# Patient Record
Sex: FEMALE | Race: WHITE | NOT HISPANIC OR LATINO | URBAN - METROPOLITAN AREA
[De-identification: names, ages, dates, MRNs, and addresses within clinical notes are randomized per-mention and may not be internally consistent; named-entity substitution may affect disease eponyms.]

---

## 2017-03-07 ENCOUNTER — INPATIENT (INPATIENT)
Facility: HOSPITAL | Age: 67
LOS: 3 days | Discharge: HOME | End: 2017-03-11
Attending: INTERNAL MEDICINE | Admitting: INTERNAL MEDICINE
Payer: MEDICARE

## 2017-03-07 VITALS
WEIGHT: 203.05 LBS | DIASTOLIC BLOOD PRESSURE: 87 MMHG | HEART RATE: 77 BPM | OXYGEN SATURATION: 97 % | RESPIRATION RATE: 19 BRPM | HEIGHT: 62 IN | SYSTOLIC BLOOD PRESSURE: 167 MMHG

## 2017-03-07 DIAGNOSIS — I25.10 ATHEROSCLEROTIC HEART DISEASE OF NATIVE CORONARY ARTERY WITHOUT ANGINA PECTORIS: ICD-10-CM

## 2017-03-07 DIAGNOSIS — E11.9 TYPE 2 DIABETES MELLITUS WITHOUT COMPLICATIONS: ICD-10-CM

## 2017-03-07 DIAGNOSIS — N23 UNSPECIFIED RENAL COLIC: ICD-10-CM

## 2017-03-07 DIAGNOSIS — N20.0 CALCULUS OF KIDNEY: ICD-10-CM

## 2017-03-07 DIAGNOSIS — I10 ESSENTIAL (PRIMARY) HYPERTENSION: ICD-10-CM

## 2017-03-07 LAB
ALBUMIN SERPL ELPH-MCNC: 3.8 G/DL — SIGNIFICANT CHANGE UP (ref 3.3–5)
ALP SERPL-CCNC: 113 U/L — SIGNIFICANT CHANGE UP (ref 40–120)
ALT FLD-CCNC: 20 U/L — SIGNIFICANT CHANGE UP (ref 12–78)
AMYLASE P1 CFR SERPL: 57 U/L — SIGNIFICANT CHANGE UP (ref 25–115)
ANION GAP SERPL CALC-SCNC: 12 MMOL/L — SIGNIFICANT CHANGE UP (ref 5–17)
APPEARANCE UR: ABNORMAL
AST SERPL-CCNC: 22 U/L — SIGNIFICANT CHANGE UP (ref 15–37)
BACTERIA # UR AUTO: ABNORMAL
BASOPHILS # BLD AUTO: 0.1 K/UL — SIGNIFICANT CHANGE UP (ref 0–0.2)
BASOPHILS NFR BLD AUTO: 0.6 % — SIGNIFICANT CHANGE UP (ref 0–2)
BILIRUB SERPL-MCNC: 0.5 MG/DL — SIGNIFICANT CHANGE UP (ref 0.2–1.2)
BILIRUB UR-MCNC: NEGATIVE — SIGNIFICANT CHANGE UP
BUN SERPL-MCNC: 27 MG/DL — HIGH (ref 7–23)
CALCIUM SERPL-MCNC: 9.3 MG/DL — SIGNIFICANT CHANGE UP (ref 8.5–10.1)
CHLORIDE SERPL-SCNC: 105 MMOL/L — SIGNIFICANT CHANGE UP (ref 96–108)
CO2 SERPL-SCNC: 23 MMOL/L — SIGNIFICANT CHANGE UP (ref 22–31)
COLOR SPEC: YELLOW — SIGNIFICANT CHANGE UP
CREAT SERPL-MCNC: 1.04 MG/DL — SIGNIFICANT CHANGE UP (ref 0.5–1.3)
DIFF PNL FLD: ABNORMAL
EOSINOPHIL # BLD AUTO: 0.1 K/UL — SIGNIFICANT CHANGE UP (ref 0–0.5)
EOSINOPHIL NFR BLD AUTO: 0.5 % — SIGNIFICANT CHANGE UP (ref 0–6)
EPI CELLS # UR: ABNORMAL
GLUCOSE SERPL-MCNC: 219 MG/DL — HIGH (ref 70–99)
GLUCOSE UR QL: 250 MG/DL
HCG SERPL-ACNC: 2 MIU/ML — SIGNIFICANT CHANGE UP
HCT VFR BLD CALC: 38.2 % — SIGNIFICANT CHANGE UP (ref 34.5–45)
HGB BLD-MCNC: 12.9 G/DL — SIGNIFICANT CHANGE UP (ref 11.5–15.5)
KETONES UR-MCNC: NEGATIVE — SIGNIFICANT CHANGE UP
LEUKOCYTE ESTERASE UR-ACNC: ABNORMAL
LIDOCAIN IGE QN: 130 U/L — SIGNIFICANT CHANGE UP (ref 73–393)
LYMPHOCYTES # BLD AUTO: 15.7 % — SIGNIFICANT CHANGE UP (ref 13–44)
LYMPHOCYTES # BLD AUTO: 2 K/UL — SIGNIFICANT CHANGE UP (ref 1–3.3)
MCHC RBC-ENTMCNC: 26.8 PG — LOW (ref 27–34)
MCHC RBC-ENTMCNC: 33.9 GM/DL — SIGNIFICANT CHANGE UP (ref 32–36)
MCV RBC AUTO: 79.1 FL — LOW (ref 80–100)
MONOCYTES # BLD AUTO: 0.8 K/UL — SIGNIFICANT CHANGE UP (ref 0–0.9)
MONOCYTES NFR BLD AUTO: 6.1 % — SIGNIFICANT CHANGE UP (ref 2–14)
NEUTROPHILS # BLD AUTO: 9.7 K/UL — HIGH (ref 1.8–7.4)
NEUTROPHILS NFR BLD AUTO: 77.1 % — HIGH (ref 43–77)
NITRITE UR-MCNC: NEGATIVE — SIGNIFICANT CHANGE UP
PH UR: 5 — SIGNIFICANT CHANGE UP (ref 4.8–8)
PLATELET # BLD AUTO: 276 K/UL — SIGNIFICANT CHANGE UP (ref 150–400)
POTASSIUM SERPL-MCNC: 4.5 MMOL/L — SIGNIFICANT CHANGE UP (ref 3.5–5.3)
POTASSIUM SERPL-SCNC: 4.5 MMOL/L — SIGNIFICANT CHANGE UP (ref 3.5–5.3)
PROT SERPL-MCNC: 8 GM/DL — SIGNIFICANT CHANGE UP (ref 6–8.3)
PROT UR-MCNC: 15 MG/DL
RBC # BLD: 4.83 M/UL — SIGNIFICANT CHANGE UP (ref 3.8–5.2)
RBC # FLD: 12.4 % — SIGNIFICANT CHANGE UP (ref 11–15)
RBC CASTS # UR COMP ASSIST: SIGNIFICANT CHANGE UP /HPF (ref 0–4)
SODIUM SERPL-SCNC: 140 MMOL/L — SIGNIFICANT CHANGE UP (ref 135–145)
SP GR SPEC: 1.02 — SIGNIFICANT CHANGE UP (ref 1.01–1.02)
UROBILINOGEN FLD QL: NEGATIVE MG/DL — SIGNIFICANT CHANGE UP
WBC # BLD: 12.6 K/UL — HIGH (ref 3.8–10.5)
WBC # FLD AUTO: 12.6 K/UL — HIGH (ref 3.8–10.5)
WBC UR QL: ABNORMAL

## 2017-03-07 PROCEDURE — 99285 EMERGENCY DEPT VISIT HI MDM: CPT

## 2017-03-07 PROCEDURE — 74176 CT ABD & PELVIS W/O CONTRAST: CPT | Mod: 26

## 2017-03-07 RX ORDER — SODIUM CHLORIDE 9 MG/ML
1000 INJECTION, SOLUTION INTRAVENOUS
Qty: 0 | Refills: 0 | Status: DISCONTINUED | OUTPATIENT
Start: 2017-03-07 | End: 2017-03-09

## 2017-03-07 RX ORDER — ACETAMINOPHEN 500 MG
650 TABLET ORAL EVERY 6 HOURS
Qty: 0 | Refills: 0 | Status: DISCONTINUED | OUTPATIENT
Start: 2017-03-07 | End: 2017-03-09

## 2017-03-07 RX ORDER — FAMOTIDINE 10 MG/ML
20 INJECTION INTRAVENOUS ONCE
Qty: 0 | Refills: 0 | Status: COMPLETED | OUTPATIENT
Start: 2017-03-07 | End: 2017-03-07

## 2017-03-07 RX ORDER — METOPROLOL TARTRATE 50 MG
50 TABLET ORAL DAILY
Qty: 0 | Refills: 0 | Status: DISCONTINUED | OUTPATIENT
Start: 2017-03-07 | End: 2017-03-09

## 2017-03-07 RX ORDER — DEXTROSE 50 % IN WATER 50 %
1 SYRINGE (ML) INTRAVENOUS ONCE
Qty: 0 | Refills: 0 | Status: DISCONTINUED | OUTPATIENT
Start: 2017-03-07 | End: 2017-03-09

## 2017-03-07 RX ORDER — DEXTROSE 50 % IN WATER 50 %
25 SYRINGE (ML) INTRAVENOUS ONCE
Qty: 0 | Refills: 0 | Status: DISCONTINUED | OUTPATIENT
Start: 2017-03-07 | End: 2017-03-09

## 2017-03-07 RX ORDER — CEFTRIAXONE 500 MG/1
1 INJECTION, POWDER, FOR SOLUTION INTRAMUSCULAR; INTRAVENOUS EVERY 24 HOURS
Qty: 0 | Refills: 0 | Status: DISCONTINUED | OUTPATIENT
Start: 2017-03-07 | End: 2017-03-09

## 2017-03-07 RX ORDER — CLOPIDOGREL BISULFATE 75 MG/1
75 TABLET, FILM COATED ORAL DAILY
Qty: 0 | Refills: 0 | Status: DISCONTINUED | OUTPATIENT
Start: 2017-03-07 | End: 2017-03-08

## 2017-03-07 RX ORDER — DEXTROSE 50 % IN WATER 50 %
12.5 SYRINGE (ML) INTRAVENOUS ONCE
Qty: 0 | Refills: 0 | Status: DISCONTINUED | OUTPATIENT
Start: 2017-03-07 | End: 2017-03-09

## 2017-03-07 RX ORDER — GLUCAGON INJECTION, SOLUTION 0.5 MG/.1ML
1 INJECTION, SOLUTION SUBCUTANEOUS ONCE
Qty: 0 | Refills: 0 | Status: DISCONTINUED | OUTPATIENT
Start: 2017-03-07 | End: 2017-03-09

## 2017-03-07 RX ORDER — SODIUM CHLORIDE 9 MG/ML
1000 INJECTION INTRAMUSCULAR; INTRAVENOUS; SUBCUTANEOUS ONCE
Qty: 0 | Refills: 0 | Status: COMPLETED | OUTPATIENT
Start: 2017-03-07 | End: 2017-03-07

## 2017-03-07 RX ORDER — MORPHINE SULFATE 50 MG/1
2 CAPSULE, EXTENDED RELEASE ORAL ONCE
Qty: 0 | Refills: 0 | Status: DISCONTINUED | OUTPATIENT
Start: 2017-03-07 | End: 2017-03-07

## 2017-03-07 RX ORDER — ONDANSETRON 8 MG/1
8 TABLET, FILM COATED ORAL ONCE
Qty: 0 | Refills: 0 | Status: COMPLETED | OUTPATIENT
Start: 2017-03-07 | End: 2017-03-07

## 2017-03-07 RX ORDER — MORPHINE SULFATE 50 MG/1
2 CAPSULE, EXTENDED RELEASE ORAL EVERY 4 HOURS
Qty: 0 | Refills: 0 | Status: DISCONTINUED | OUTPATIENT
Start: 2017-03-07 | End: 2017-03-09

## 2017-03-07 RX ORDER — CEFTRIAXONE 500 MG/1
1 INJECTION, POWDER, FOR SOLUTION INTRAMUSCULAR; INTRAVENOUS ONCE
Qty: 0 | Refills: 0 | Status: COMPLETED | OUTPATIENT
Start: 2017-03-07 | End: 2017-03-07

## 2017-03-07 RX ORDER — INSULIN LISPRO 100/ML
VIAL (ML) SUBCUTANEOUS
Qty: 0 | Refills: 0 | Status: DISCONTINUED | OUTPATIENT
Start: 2017-03-07 | End: 2017-03-09

## 2017-03-07 RX ORDER — KETOROLAC TROMETHAMINE 30 MG/ML
30 SYRINGE (ML) INJECTION ONCE
Qty: 0 | Refills: 0 | Status: DISCONTINUED | OUTPATIENT
Start: 2017-03-07 | End: 2017-03-07

## 2017-03-07 RX ORDER — METOPROLOL TARTRATE 50 MG
1 TABLET ORAL
Qty: 0 | Refills: 0 | COMMUNITY

## 2017-03-07 RX ORDER — DIPHENHYDRAMINE HCL 50 MG
25 CAPSULE ORAL ONCE
Qty: 0 | Refills: 0 | Status: DISCONTINUED | OUTPATIENT
Start: 2017-03-07 | End: 2017-03-09

## 2017-03-07 RX ORDER — ATORVASTATIN CALCIUM 80 MG/1
80 TABLET, FILM COATED ORAL AT BEDTIME
Qty: 0 | Refills: 0 | Status: DISCONTINUED | OUTPATIENT
Start: 2017-03-07 | End: 2017-03-09

## 2017-03-07 RX ADMIN — SODIUM CHLORIDE 75 MILLILITER(S): 9 INJECTION, SOLUTION INTRAVENOUS at 16:53

## 2017-03-07 RX ADMIN — FAMOTIDINE 20 MILLIGRAM(S): 10 INJECTION INTRAVENOUS at 09:45

## 2017-03-07 RX ADMIN — SODIUM CHLORIDE 2000 MILLILITER(S): 9 INJECTION INTRAMUSCULAR; INTRAVENOUS; SUBCUTANEOUS at 09:35

## 2017-03-07 RX ADMIN — MORPHINE SULFATE 2 MILLIGRAM(S): 50 CAPSULE, EXTENDED RELEASE ORAL at 13:16

## 2017-03-07 RX ADMIN — MORPHINE SULFATE 2 MILLIGRAM(S): 50 CAPSULE, EXTENDED RELEASE ORAL at 10:02

## 2017-03-07 RX ADMIN — Medication 30 MILLIGRAM(S): at 10:02

## 2017-03-07 RX ADMIN — CEFTRIAXONE 100 GRAM(S): 500 INJECTION, POWDER, FOR SOLUTION INTRAMUSCULAR; INTRAVENOUS at 14:09

## 2017-03-07 RX ADMIN — SODIUM CHLORIDE 75 MILLILITER(S): 9 INJECTION, SOLUTION INTRAVENOUS at 19:30

## 2017-03-07 RX ADMIN — ATORVASTATIN CALCIUM 80 MILLIGRAM(S): 80 TABLET, FILM COATED ORAL at 21:43

## 2017-03-07 RX ADMIN — Medication 2: at 16:52

## 2017-03-07 RX ADMIN — CLOPIDOGREL BISULFATE 75 MILLIGRAM(S): 75 TABLET, FILM COATED ORAL at 16:56

## 2017-03-07 RX ADMIN — MORPHINE SULFATE 2 MILLIGRAM(S): 50 CAPSULE, EXTENDED RELEASE ORAL at 10:03

## 2017-03-07 RX ADMIN — ONDANSETRON 8 MILLIGRAM(S): 8 TABLET, FILM COATED ORAL at 09:44

## 2017-03-07 RX ADMIN — Medication 125 MILLIGRAM(S): at 14:09

## 2017-03-07 NOTE — ED PROVIDER NOTE - MEDICAL DECISION MAKING DETAILS
pt presents today with right flank pain since 3am, has h/o kidney stones, labs, ivf, pain control and ct

## 2017-03-07 NOTE — ED PROVIDER NOTE - CARE PLAN
Principal Discharge DX:	Right flank pain Principal Discharge DX:	Kidney stone on right side  Secondary Diagnosis:	Intractable pain

## 2017-03-07 NOTE — ED PROVIDER NOTE - OBJECTIVE STATEMENT
67 year o 67 year old female with past medical history of HTN, CAD s/p coronary artery stent and kidney stones presents today c/o right flank pain sine 3am, she describes a pain that was initially tolerable, but now constant dull ache that is nonradiating rated 6-7/10 +nausea/vomiting once prior to coming in, still nauseous in the ER (-)hematuria (-) fevers (-) chills

## 2017-03-07 NOTE — H&P ADULT. - ASSESSMENT
67 years old female is admitted for right renal colic, UTI, dehydration and hyperglycemia from DM II.  Plan: IV Abx, IV hydration. Pain management

## 2017-03-07 NOTE — ED ADULT NURSE NOTE - OBJECTIVE STATEMENT
States she has had back pain since last night and it has gotten progressively worse. Denies taking anything to alleviate the pain.

## 2017-03-07 NOTE — ED PROVIDER NOTE - CONSTITUTIONAL, MLM
normal... Well appearing, well nourished, awake, alert, oriented to person, place, time/situation, sitting up in the bed appears uncomfortable

## 2017-03-07 NOTE — CONSULT NOTE ADULT - ASSESSMENT
67 yrs old female severe pain right flank with 7 mm stone at right UPJ with hydronephrosis admitted for pain management and treatment of the stone.

## 2017-03-07 NOTE — H&P ADULT. - ATTENDING COMMENTS
right flank pain , right hydronephrosis, right UPJ stone. Persistent pain, scheduled for right uretero lithotripsy and insertion of stent

## 2017-03-07 NOTE — ED ADULT TRIAGE NOTE - CHIEF COMPLAINT QUOTE
Pt with right flank pain since last night and she has a history of kidney stones. With nausea and vomiting

## 2017-03-08 LAB
ANION GAP SERPL CALC-SCNC: 11 MMOL/L — SIGNIFICANT CHANGE UP (ref 5–17)
BUN SERPL-MCNC: 28 MG/DL — HIGH (ref 7–23)
CALCIUM SERPL-MCNC: 8.3 MG/DL — LOW (ref 8.5–10.1)
CHLORIDE SERPL-SCNC: 106 MMOL/L — SIGNIFICANT CHANGE UP (ref 96–108)
CO2 SERPL-SCNC: 24 MMOL/L — SIGNIFICANT CHANGE UP (ref 22–31)
CREAT SERPL-MCNC: 1.45 MG/DL — HIGH (ref 0.5–1.3)
GLUCOSE SERPL-MCNC: 219 MG/DL — HIGH (ref 70–99)
HBA1C BLD-MCNC: 8.5 % — HIGH (ref 4–5.6)
HCT VFR BLD CALC: 34.4 % — LOW (ref 34.5–45)
HGB BLD-MCNC: 12 G/DL — SIGNIFICANT CHANGE UP (ref 11.5–15.5)
MCHC RBC-ENTMCNC: 27.6 PG — SIGNIFICANT CHANGE UP (ref 27–34)
MCHC RBC-ENTMCNC: 35 GM/DL — SIGNIFICANT CHANGE UP (ref 32–36)
MCV RBC AUTO: 78.8 FL — LOW (ref 80–100)
PLATELET # BLD AUTO: 207 K/UL — SIGNIFICANT CHANGE UP (ref 150–400)
POTASSIUM SERPL-MCNC: 4.4 MMOL/L — SIGNIFICANT CHANGE UP (ref 3.5–5.3)
POTASSIUM SERPL-SCNC: 4.4 MMOL/L — SIGNIFICANT CHANGE UP (ref 3.5–5.3)
RBC # BLD: 4.37 M/UL — SIGNIFICANT CHANGE UP (ref 3.8–5.2)
RBC # FLD: 12.4 % — SIGNIFICANT CHANGE UP (ref 11–15)
SODIUM SERPL-SCNC: 141 MMOL/L — SIGNIFICANT CHANGE UP (ref 135–145)
WBC # BLD: 13.2 K/UL — HIGH (ref 3.8–10.5)
WBC # FLD AUTO: 13.2 K/UL — HIGH (ref 3.8–10.5)

## 2017-03-08 PROCEDURE — 74000: CPT | Mod: 26

## 2017-03-08 RX ADMIN — SODIUM CHLORIDE 75 MILLILITER(S): 9 INJECTION, SOLUTION INTRAVENOUS at 18:27

## 2017-03-08 RX ADMIN — Medication 4: at 19:26

## 2017-03-08 RX ADMIN — MORPHINE SULFATE 2 MILLIGRAM(S): 50 CAPSULE, EXTENDED RELEASE ORAL at 16:40

## 2017-03-08 RX ADMIN — MORPHINE SULFATE 2 MILLIGRAM(S): 50 CAPSULE, EXTENDED RELEASE ORAL at 20:41

## 2017-03-08 RX ADMIN — MORPHINE SULFATE 2 MILLIGRAM(S): 50 CAPSULE, EXTENDED RELEASE ORAL at 16:21

## 2017-03-08 RX ADMIN — Medication 50 MILLIGRAM(S): at 05:06

## 2017-03-08 RX ADMIN — Medication 4: at 11:49

## 2017-03-08 RX ADMIN — Medication 4: at 07:57

## 2017-03-08 RX ADMIN — ATORVASTATIN CALCIUM 80 MILLIGRAM(S): 80 TABLET, FILM COATED ORAL at 22:13

## 2017-03-08 RX ADMIN — CEFTRIAXONE 100 GRAM(S): 500 INJECTION, POWDER, FOR SOLUTION INTRAMUSCULAR; INTRAVENOUS at 15:00

## 2017-03-08 RX ADMIN — MORPHINE SULFATE 2 MILLIGRAM(S): 50 CAPSULE, EXTENDED RELEASE ORAL at 20:56

## 2017-03-08 RX ADMIN — CLOPIDOGREL BISULFATE 75 MILLIGRAM(S): 75 TABLET, FILM COATED ORAL at 12:45

## 2017-03-08 NOTE — PROGRESS NOTE ADULT - SUBJECTIVE AND OBJECTIVE BOX
Patient is a 67y old  Female who presents with a chief complaint of Renal colic (07 Mar 2017 15:25)    MEDICAL PROBLEMS:  KIDNEY STONE ON RIGHT SIDE  KIDNEY STONE  Kidney stones  HTN (hypertension)  CAD (coronary artery disease)  Kidney stone on right side  Right flank pain  Diabetes  CAD (coronary artery disease)  HTN (hypertension)  Kidney stone on right side  Renal colic on right side  Intractable pain      INTERVAL HPI/OVERNIGHT EVENTS: Right renal colic    MEDICATIONS  (STANDING):  atorvastatin 80milliGRAM(s) Oral at bedtime  clopidogrel Tablet 75milliGRAM(s) Oral daily  metoprolol succinate ER 50milliGRAM(s) Oral daily  sodium chloride 0.45%. 1000milliLiter(s) IV Continuous <Continuous>  cefTRIAXone   IVPB 1Gram(s) IV Intermittent every 24 hours  insulin lispro (HumaLOG) corrective regimen sliding scale  SubCutaneous three times a day before meals  dextrose 5%. 1000milliLiter(s) IV Continuous <Continuous>  dextrose 50% Injectable 12.5Gram(s) IV Push once  dextrose 50% Injectable 25Gram(s) IV Push once  dextrose 50% Injectable 25Gram(s) IV Push once    MEDICATIONS  (PRN):  diphenhydrAMINE   Capsule 25milliGRAM(s) Oral once PRN Rash and/or Itching  dextrose Gel 1Dose(s) Oral once PRN Blood Glucose LESS THAN 70 milliGRAM(s)/deciliter  glucagon  Injectable 1milliGRAM(s) IntraMuscular once PRN Glucose LESS THAN 70 milligrams/deciliter  acetaminophen   Tablet 650milliGRAM(s) Oral every 6 hours PRN For Temp greater than 38 C (100.4 F)  acetaminophen   Tablet. 650milliGRAM(s) Oral every 6 hours PRN Mild Pain (1 - 3)  morphine  - Injectable 2milliGRAM(s) IV Push every 4 hours PRN Moderate Pain (4 - 6)    Allergies    No Known Allergies    Intolerances      Vital Signs Last 24 Hrs  T(C): 36.7, Max: 37.1 (03-07 @ 23:58)  T(F): 98, Max: 98.7 (03-07 @ 23:58)  HR: 79 (77 - 89)  BP: 146/61 (138/84 - 179/56)  BP(mean): --  RR: 17 (16 - 18)  SpO2: 97% (95% - 97%)    PHYSICAL EXAM:  GENERAL: NAD, well-groomed, well-developed  HEAD:  Atraumatic, Normocephalic  EYES: EOMI, PERRLA, conjunctiva and sclera clear  ENMT: No tonsillar erythema, exudates, or enlargement; Moist mucous membranes, Good dentition, No lesions  NECK: Supple, No JVD, Normal thyroid  NERVOUS SYSTEM:  Alert & Oriented X3, Good concentration; Motor Strength 5/5 B/L upper and lower extremities; DTRs 2+ intact and symmetric  CHEST/LUNG: Clear to percussion bilaterally; No rales, rhonchi, wheezing, or rubs  HEART: Regular rate and rhythm; No murmurs, rubs, or gallops  ABDOMEN: Soft, Nontender, Nondistended; Bowel sounds present  EXTREMITIES:  2+ Peripheral Pulses, No clubbing, cyanosis, or edema  LYMPH: No lymphadenopathy noted  SKIN: No rashes or lesions    I & Os for current day (as of  @ 09:09)  =============================================  IN: 975 ml / OUT: 0 ml / NET: 975 ml      LABS:                        12.0   13.2  )-----------( 207      ( 08 Mar 2017 06:36 )             34.4     08 Mar 2017 06:36    141    |  106    |  28     ----------------------------<  219    4.4     |  24     |  1.45     Ca    8.3        08 Mar 2017 06:36    TPro  8.0    /  Alb  3.8    /  TBili  0.5    /  DBili  x      /  AST  22     /  ALT  20     /  AlkPhos  113    07 Mar 2017 10:18      Urinalysis Basic - ( 07 Mar 2017 20:36 )    Color: Yellow / Appearance: Slightly Turbid / S.020 / pH: x  Gluc: x / Ketone: Negative  / Bili: Negative / Urobili: Negative mg/dL   Blood: x / Protein: 15 mg/dL / Nitrite: Negative   Leuk Esterase: Moderate / RBC: 0-2 /HPF / WBC 11-25   Sq Epi: x / Non Sq Epi: Moderate / Bacteria: Moderate      CAPILLARY BLOOD GLUCOSE  204 (08 Mar 2017 07:49)  229 (07 Mar 2017 21:41)  177 (07 Mar 2017 16:43)    RADIOLOGY & ADDITIONAL TESTS:    Imaging Personally Reviewed:  [X] YES  [ ] NO    Consultant(s) Notes Reviewed:  [X] YES  [ ] NO    Care Discussed with Consultants/Other Providers [X] YES  [ ] NO

## 2017-03-08 NOTE — PROGRESS NOTE ADULT - SUBJECTIVE AND OBJECTIVE BOX
Patient seen and examined bedside resting comfortably.  No complaints offered. No pain. No difficulty urinating  Denies nausea and vomiting. Tolerating diet.  Flatus/BM. +  Denies chest pain, dyspnea, cough.    T(F): 98, Max: 98.7 (03-07 @ 23:58)  HR: 79 (77 - 89)  BP: 146/61 (138/84 - 179/56)  RR: 17 (16 - 18)  SpO2: 97% (95% - 97%)    CAPILLARY BLOOD GLUCOSE  204 (08 Mar 2017 07:49)  229 (07 Mar 2017 21:41)  177 (07 Mar 2017 16:43)      PHYSICAL EXAM:  General: NAD, WDWN  Neuro:  Alert & oriented x 3  Abdomen: soft, NTND. No right flank tenderness.      LABS:                        12.0   13.2  )-----------( 207      ( 08 Mar 2017 06:36 )             34.4     08 Mar 2017 06:36    141    |  106    |  28     ----------------------------<  219    4.4     |  24     |  1.45     Ca    8.3        08 Mar 2017 06:36    TPro  8.0    /  Alb  3.8    /  TBili  0.5    /  DBili  x      /  AST  22     /  ALT  20     /  AlkPhos  113    07 Mar 2017 10:18    Urinalysis (03.07.17 @ 20:36)    Blood, Urine: Small    Glucose Qualitative, Urine: 250 mg/dL    pH Urine: 5.0    Color: Yellow    Urine Appearance: Slightly Turbid    Bilirubin: Negative    Ketone - Urine: Negative    Epithelial Cells: Moderate    Bacteria: Moderate    White Blood Cell - Urine: 11-25    Red Blood Cell - Urine: 0-2 /HPF   Specific Gravity: 1.020    Protein, Urine: 15 mg/dL    Urobilinogen: Negative mg/dL    Nitrite: Negative    Leukocyte Esterase Concentration: Moderate    [place]:  6-7 mm RIGHT ureteropelvic junction (UPJ) calculus.  Mild RIGHT hydronephrosis        I&O's Detail    I & Os for current day (as of 08 Mar 2017 08:43)  =============================================  IN:    sodium chloride 0.45%.: 975 ml    Total IN: 975 ml  ---------------------------------------------  OUT:    Total OUT: 0 ml  ---------------------------------------------  Total NET: 975 ml      Impression: 67y Female admitted with KIDNEY STONE ON RIGHT SIDE  KIDNEY STONE        Plan:  -continue VTE prophylaxis   - continue IVAB per ID  - continue medical f/u  -Increase activity with PT, OOB, Ambulate  -Patient instructed on and encouraged incentive spirometry use  -continue local wound care  -f/u AM labs  -Await reevaluation by Dr. Gallardo. Lithotripsy?

## 2017-03-09 DIAGNOSIS — E11.9 TYPE 2 DIABETES MELLITUS WITHOUT COMPLICATIONS: ICD-10-CM

## 2017-03-09 LAB
ANION GAP SERPL CALC-SCNC: 10 MMOL/L — SIGNIFICANT CHANGE UP (ref 5–17)
APTT BLD: 27 SEC — LOW (ref 27.5–37.4)
BASOPHILS # BLD AUTO: 0.1 K/UL — SIGNIFICANT CHANGE UP (ref 0–0.2)
BASOPHILS NFR BLD AUTO: 0.6 % — SIGNIFICANT CHANGE UP (ref 0–2)
BUN SERPL-MCNC: 28 MG/DL — HIGH (ref 7–23)
CALCIUM SERPL-MCNC: 8.2 MG/DL — LOW (ref 8.5–10.1)
CHLORIDE SERPL-SCNC: 105 MMOL/L — SIGNIFICANT CHANGE UP (ref 96–108)
CO2 SERPL-SCNC: 24 MMOL/L — SIGNIFICANT CHANGE UP (ref 22–31)
CREAT SERPL-MCNC: 1.32 MG/DL — HIGH (ref 0.5–1.3)
EOSINOPHIL # BLD AUTO: 0.1 K/UL — SIGNIFICANT CHANGE UP (ref 0–0.5)
EOSINOPHIL NFR BLD AUTO: 0.9 % — SIGNIFICANT CHANGE UP (ref 0–6)
GLUCOSE SERPL-MCNC: 151 MG/DL — HIGH (ref 70–99)
HCT VFR BLD CALC: 36 % — SIGNIFICANT CHANGE UP (ref 34.5–45)
HGB BLD-MCNC: 12.2 G/DL — SIGNIFICANT CHANGE UP (ref 11.5–15.5)
INR BLD: 1.01 RATIO — SIGNIFICANT CHANGE UP (ref 0.88–1.16)
LYMPHOCYTES # BLD AUTO: 2.6 K/UL — SIGNIFICANT CHANGE UP (ref 1–3.3)
LYMPHOCYTES # BLD AUTO: 20 % — SIGNIFICANT CHANGE UP (ref 13–44)
MCHC RBC-ENTMCNC: 26.9 PG — LOW (ref 27–34)
MCHC RBC-ENTMCNC: 33.9 GM/DL — SIGNIFICANT CHANGE UP (ref 32–36)
MCV RBC AUTO: 79.3 FL — LOW (ref 80–100)
MONOCYTES # BLD AUTO: 1.5 K/UL — HIGH (ref 0–0.9)
MONOCYTES NFR BLD AUTO: 11.8 % — SIGNIFICANT CHANGE UP (ref 2–14)
NEUTROPHILS # BLD AUTO: 8.7 K/UL — HIGH (ref 1.8–7.4)
NEUTROPHILS NFR BLD AUTO: 66.6 % — SIGNIFICANT CHANGE UP (ref 43–77)
PLATELET # BLD AUTO: 238 K/UL — SIGNIFICANT CHANGE UP (ref 150–400)
POTASSIUM SERPL-MCNC: 4.3 MMOL/L — SIGNIFICANT CHANGE UP (ref 3.5–5.3)
POTASSIUM SERPL-SCNC: 4.3 MMOL/L — SIGNIFICANT CHANGE UP (ref 3.5–5.3)
PROTHROM AB SERPL-ACNC: 11.3 SEC — SIGNIFICANT CHANGE UP (ref 10–13.1)
RBC # BLD: 4.54 M/UL — SIGNIFICANT CHANGE UP (ref 3.8–5.2)
RBC # FLD: 12.6 % — SIGNIFICANT CHANGE UP (ref 11–15)
SODIUM SERPL-SCNC: 139 MMOL/L — SIGNIFICANT CHANGE UP (ref 135–145)
WBC # BLD: 13 K/UL — HIGH (ref 3.8–10.5)
WBC # FLD AUTO: 13 K/UL — HIGH (ref 3.8–10.5)

## 2017-03-09 RX ORDER — DEXTROSE 50 % IN WATER 50 %
1 SYRINGE (ML) INTRAVENOUS ONCE
Qty: 0 | Refills: 0 | Status: DISCONTINUED | OUTPATIENT
Start: 2017-03-09 | End: 2017-03-11

## 2017-03-09 RX ORDER — DEXTROSE 50 % IN WATER 50 %
25 SYRINGE (ML) INTRAVENOUS ONCE
Qty: 0 | Refills: 0 | Status: DISCONTINUED | OUTPATIENT
Start: 2017-03-09 | End: 2017-03-11

## 2017-03-09 RX ORDER — SODIUM CHLORIDE 9 MG/ML
1000 INJECTION, SOLUTION INTRAVENOUS
Qty: 0 | Refills: 0 | Status: DISCONTINUED | OUTPATIENT
Start: 2017-03-09 | End: 2017-03-09

## 2017-03-09 RX ORDER — GLUCAGON INJECTION, SOLUTION 0.5 MG/.1ML
1 INJECTION, SOLUTION SUBCUTANEOUS ONCE
Qty: 0 | Refills: 0 | Status: DISCONTINUED | OUTPATIENT
Start: 2017-03-09 | End: 2017-03-11

## 2017-03-09 RX ORDER — ATORVASTATIN CALCIUM 80 MG/1
80 TABLET, FILM COATED ORAL AT BEDTIME
Qty: 0 | Refills: 0 | Status: DISCONTINUED | OUTPATIENT
Start: 2017-03-09 | End: 2017-03-11

## 2017-03-09 RX ORDER — MORPHINE SULFATE 50 MG/1
2 CAPSULE, EXTENDED RELEASE ORAL ONCE
Qty: 0 | Refills: 0 | Status: DISCONTINUED | OUTPATIENT
Start: 2017-03-09 | End: 2017-03-11

## 2017-03-09 RX ORDER — CEFTRIAXONE 500 MG/1
1 INJECTION, POWDER, FOR SOLUTION INTRAMUSCULAR; INTRAVENOUS EVERY 24 HOURS
Qty: 0 | Refills: 0 | Status: DISCONTINUED | OUTPATIENT
Start: 2017-03-09 | End: 2017-03-11

## 2017-03-09 RX ORDER — ACETAMINOPHEN 500 MG
650 TABLET ORAL EVERY 6 HOURS
Qty: 0 | Refills: 0 | Status: DISCONTINUED | OUTPATIENT
Start: 2017-03-09 | End: 2017-03-11

## 2017-03-09 RX ORDER — MORPHINE SULFATE 50 MG/1
2 CAPSULE, EXTENDED RELEASE ORAL EVERY 4 HOURS
Qty: 0 | Refills: 0 | Status: DISCONTINUED | OUTPATIENT
Start: 2017-03-09 | End: 2017-03-11

## 2017-03-09 RX ORDER — METOPROLOL TARTRATE 50 MG
50 TABLET ORAL DAILY
Qty: 0 | Refills: 0 | Status: DISCONTINUED | OUTPATIENT
Start: 2017-03-09 | End: 2017-03-11

## 2017-03-09 RX ORDER — DIPHENHYDRAMINE HCL 50 MG
25 CAPSULE ORAL ONCE
Qty: 0 | Refills: 0 | Status: DISCONTINUED | OUTPATIENT
Start: 2017-03-09 | End: 2017-03-11

## 2017-03-09 RX ORDER — FUROSEMIDE 40 MG
10 TABLET ORAL ONCE
Qty: 0 | Refills: 0 | Status: COMPLETED | OUTPATIENT
Start: 2017-03-09 | End: 2017-03-09

## 2017-03-09 RX ORDER — SODIUM CHLORIDE 9 MG/ML
1000 INJECTION, SOLUTION INTRAVENOUS
Qty: 0 | Refills: 0 | Status: DISCONTINUED | OUTPATIENT
Start: 2017-03-09 | End: 2017-03-11

## 2017-03-09 RX ORDER — DEXTROSE 50 % IN WATER 50 %
12.5 SYRINGE (ML) INTRAVENOUS ONCE
Qty: 0 | Refills: 0 | Status: DISCONTINUED | OUTPATIENT
Start: 2017-03-09 | End: 2017-03-11

## 2017-03-09 RX ORDER — INSULIN LISPRO 100/ML
VIAL (ML) SUBCUTANEOUS
Qty: 0 | Refills: 0 | Status: DISCONTINUED | OUTPATIENT
Start: 2017-03-09 | End: 2017-03-11

## 2017-03-09 RX ADMIN — SODIUM CHLORIDE 75 MILLILITER(S): 9 INJECTION, SOLUTION INTRAVENOUS at 05:21

## 2017-03-09 RX ADMIN — SODIUM CHLORIDE 75 MILLILITER(S): 9 INJECTION, SOLUTION INTRAVENOUS at 19:00

## 2017-03-09 RX ADMIN — MORPHINE SULFATE 2 MILLIGRAM(S): 50 CAPSULE, EXTENDED RELEASE ORAL at 10:54

## 2017-03-09 RX ADMIN — ATORVASTATIN CALCIUM 80 MILLIGRAM(S): 80 TABLET, FILM COATED ORAL at 22:37

## 2017-03-09 RX ADMIN — MORPHINE SULFATE 2 MILLIGRAM(S): 50 CAPSULE, EXTENDED RELEASE ORAL at 01:56

## 2017-03-09 RX ADMIN — MORPHINE SULFATE 2 MILLIGRAM(S): 50 CAPSULE, EXTENDED RELEASE ORAL at 02:11

## 2017-03-09 RX ADMIN — MORPHINE SULFATE 2 MILLIGRAM(S): 50 CAPSULE, EXTENDED RELEASE ORAL at 06:55

## 2017-03-09 RX ADMIN — Medication 10 MILLIGRAM(S): at 19:22

## 2017-03-09 RX ADMIN — MORPHINE SULFATE 2 MILLIGRAM(S): 50 CAPSULE, EXTENDED RELEASE ORAL at 11:10

## 2017-03-09 RX ADMIN — Medication 50 MILLIGRAM(S): at 05:25

## 2017-03-09 RX ADMIN — CEFTRIAXONE 100 GRAM(S): 500 INJECTION, POWDER, FOR SOLUTION INTRAMUSCULAR; INTRAVENOUS at 14:30

## 2017-03-09 RX ADMIN — SODIUM CHLORIDE 75 MILLILITER(S): 9 INJECTION, SOLUTION INTRAVENOUS at 20:38

## 2017-03-09 RX ADMIN — MORPHINE SULFATE 2 MILLIGRAM(S): 50 CAPSULE, EXTENDED RELEASE ORAL at 06:39

## 2017-03-09 NOTE — BRIEF OPERATIVE NOTE - PROCEDURE
Ureteral stent placement, intraoperatively (not at tx)  03/09/2017    Active  [facility]  Ureteroscopy with manipulation of right ureteral calculus  03/09/2017    Active  [facility]

## 2017-03-09 NOTE — BRIEF OPERATIVE NOTE - POST-OP DX
Hydronephrosis of right kidney  03/09/2017    Christopher Patel  Renal colic on right side  03/09/2017    Christopher Patel  Ureteral stone  03/09/2017  right UPJ  Active  Christopher Gallardo

## 2017-03-09 NOTE — PROGRESS NOTE ADULT - SUBJECTIVE AND OBJECTIVE BOX
This 67yFemale is scheduled for: lithotripsy       CBC Full  -  ( 09 Mar 2017 06:24 )  WBC Count : 13.0 K/uL  Hemoglobin : 12.2 g/dL  Hematocrit : 36.0 %  Platelet Count - Automated : 238 K/uL  Mean Cell Volume : 79.3 fl  Mean Cell Hemoglobin : 26.9 pg  Mean Cell Hemoglobin Concentration : 33.9 gm/dL  Auto Neutrophil # : 8.7 K/uL  Auto Lymphocyte # : 2.6 K/uL  Auto Monocyte # : 1.5 K/uL  Auto Eosinophil # : 0.1 K/uL  Auto Basophil # : 0.1 K/uL  Auto Neutrophil % : 66.6 %  Auto Lymphocyte % : 20.0 %  Auto Monocyte % : 11.8 %  Auto Eosinophil % : 0.9 %  Auto Basophil % : 0.6 %      09 Mar 2017 06:24    139    |  105    |  28     ----------------------------<  151    4.3     |  24     |  1.32     Ca    8.2        09 Mar 2017 06:24    TPro  8.0    /  Alb  3.8    /  TBili  0.5    /  DBili  x      /  AST  22     /  ALT  20     /  AlkPhos  113    07 Mar 2017 10:18  LIVER FUNCTIONS - ( 07 Mar 2017 10:18 )  Alb: 3.8 g/dL / Pro: 8.0 gm/dL / ALK PHOS: 113 U/L / ALT: 20 U/L / AST: 22 U/L / GGT: x              PT/INR - ( 09 Mar 2017 06:24 )   PT: 11.3 sec;   INR: 1.01 ratio         PTT - ( 09 Mar 2017 06:24 )  PTT:27.0 sec    Urinalysis Basic - ( 07 Mar 2017 20:36 )    Color: Yellow / Appearance: Slightly Turbid / S.020 / pH: x  Gluc: x / Ketone: Negative  / Bili: Negative / Urobili: Negative mg/dL   Blood: x / Protein: 15 mg/dL / Nitrite: Negative   Leuk Esterase: Moderate / RBC: 0-2 /HPF / WBC 11-25   Sq Epi: x / Non Sq Epi: Moderate / Bacteria: Moderate    HCG: negative        - consent:to be obtained by Dr. Gallardo  - orders written  - medical consult beig done by Dr. Contreras  -H&P on the chart                                            ;

## 2017-03-09 NOTE — DIETITIAN INITIAL EVALUATION ADULT. - PERTINENT LABORATORY DATA
03-09 Na139 mmol/L Glu 151 mg/dL<H> K+ 4.3 mmol/L Cr  1.32 mg/dL<H> BUN 28 mg/dL<H> Phos n/a   Alb n/a   PAB n/a   HgbA1c = 8.5% FS- 7:48/11:/123

## 2017-03-09 NOTE — PRE-OP CHECKLIST - SELECT TESTS ORDERED
BMP/PT/PTT/INR/CMP/CBC BMP/EKG/CBC/CMP/PT/PTT/INR EKG/PT/PTT/CBC/CMP/Urinalysis/INR/BMP Urinalysis/HCG/EKG/CBC/INR/CMP/BMP/PT/PTT

## 2017-03-09 NOTE — BRIEF OPERATIVE NOTE - PRE-OP DX
Hydronephrosis of right kidney  03/09/2017  possible UPJ obstruction  Active  Christopher Gallardo  Renal colic on right side  03/09/2017    Christopher Patel  Ureteral stone  03/09/2017  right UPJ  Active  Christopher Gallardo

## 2017-03-09 NOTE — CONSULT NOTE ADULT - PROBLEM SELECTOR RECOMMENDATION 9
Continue with the same regimen while inpatient   as Nutrition increases then may need Lantus added while inpatient   HbA1C 8.5, while inpatient better to have FS< 150 and preferably in 120-140 range    Patient should have strict diet control and do exercise as tolerated upon discharge   Thank You for the courtesy of this consultation !!!

## 2017-03-09 NOTE — DIETITIAN INITIAL EVALUATION ADULT. - OTHER INFO
Pt seen today due to RN referal for HgbA1c Greater than 7%. Pt lives alone. Does the food shopping/cooking for herself.. Denies food Allergies. States stable weight. Last BM reported on Monday 3/5. She states she doesn't check her FS and she takes oral medications for her diabetes and cholesterol. negative Dentition/Dentures negative difficulty chewing/swallowing. Was not aware her HgbA1c was 8.5%.

## 2017-03-09 NOTE — DIETITIAN INITIAL EVALUATION ADULT. - NS AS NUTRI INTERV ED CONTENT
Purpose of the nutrition education/Dash/TLC; Type 2 DM Nutrition Therapy; Weight loss Tips/Survival information

## 2017-03-09 NOTE — CONSULT NOTE ADULT - SUBJECTIVE AND OBJECTIVE BOX
HPI:  67 years old female comes to ER for renal colic (07 Mar 2017 15:25), severe right renal colic of ne day duration. CT scan reveals stone at right UPJ with hydronephrosis. admitted for pain management and definitive therapy if need be.      PAST MEDICAL & SURGICAL HISTORY:  Diabetes  HTN (hypertension)  Hyperlipidemia  Obesity  CAD (coronary artery disease)  Stented coronary artery      Allergies    No Known Allergies        FAMILY HISTORY: non contributory      MEDICATIONS  (STANDING):  atorvastatin 80milliGRAM(s) Oral at bedtime  clopidogrel Tablet 75milliGRAM(s) Oral daily  metoprolol succinate ER 50milliGRAM(s) Oral daily  sodium chloride 0.45%. 1000milliLiter(s) IV Continuous <Continuous>  cefTRIAXone   IVPB 1Gram(s) IV Intermittent every 24 hours  insulin lispro (HumaLOG) corrective regimen sliding scale  SubCutaneous three times a day before meals  dextrose 5%. 1000milliLiter(s) IV Continuous <Continuous>  dextrose 50% Injectable 12.5Gram(s) IV Push once  dextrose 50% Injectable 25Gram(s) IV Push once  dextrose 50% Injectable 25Gram(s) IV Push once    MEDICATIONS  (PRN):  diphenhydrAMINE   Capsule 25milliGRAM(s) Oral once PRN Rash and/or Itching  dextrose Gel 1Dose(s) Oral once PRN Blood Glucose LESS THAN 70 milliGRAM(s)/deciliter  glucagon  Injectable 1milliGRAM(s) IntraMuscular once PRN Glucose LESS THAN 70 milligrams/deciliter  acetaminophen   Tablet 650milliGRAM(s) Oral every 6 hours PRN For Temp greater than 38 C (100.4 F)  acetaminophen   Tablet. 650milliGRAM(s) Oral every 6 hours PRN Mild Pain (1 - 3)  morphine  - Injectable 2milliGRAM(s) IV Push every 4 hours PRN Moderate Pain (4 - 6)      ROS:    General:  No wt loss, fevers, chills, night sweats  Eyes:  Good vision, no reported pain  ENT:  No sore throat, pain, runny nose, dysphagia  CV:  No pain, palpitatioins, hypo/hypertension  Resp:  No dyspnea, cough, tachypnea, wheezing  GI:  No pain, nausea, vomiting, diarrhea, constipatiion  :  severe right flank pain, -bleeding, -incontinence, -nocturia, -frequency  Muscle:  No pain, weakness  Neuro:  No weakness, tingling, memory problems  Psych:  No fatigue, insomnia, mood problems, depression  Endocrine:  No polyuria, polydypsia, cold/heat intolerance  Heme:  No petechiae, ecchymosis, easy bruisability  Skin:  No rash, tattoos, scars, edema      Physical Exam:    Vital Signs:  Vital Signs Last 24 Hrs  T(C): 36.8, Max: 36.8 (03-07 @ 15:31)  T(F): 98.3, Max: 98.3 (03-07 @ 15:31)  HR: 78 (77 - 82)  BP: 161/50 (154/78 - 179/56)  BP(mean): --  RR: 18 (17 - 19)  SpO2: 96% (96% - 97%)  Daily Height in cm: 157.48 (07 Mar 2017 08:41)    Daily   I&O's Summary    I & Os for current day (as of 07 Mar 2017 19:37)  =============================================  IN: 75 ml / OUT: 0 ml / NET: 75 ml      General:  Appears stated age,  well-nourished, no distress, had Morphine and Toradol  HEENT:  NC/AT, patent nares w/ pink mucosa, OP moist and pink,  PERRL, EOMI, conjunctivae clear, no thyromegaly, nodules, adenopathy, no JVD  Chest:  Full & symmetric excursion, no increased effort.   Cardiovascular:  Regular rhythm, S1, S2,   Abdomen:  Soft, non-tender, non-distended, normoactive bowel sounds.  Pelvic: Deferrred  Rectal Examination: Deferred.  Extremities:  no edema, pedal pusation are present, no calf tenderness.  Skin:  No rash/erythema/ecchymoses/petechiae/wounds/abscess/warm/dry  Musculoskeletal:  Full ROM in all joints w/o swelling/tenderness/effusion  Neuro/Psych:  Alert, oriented, normal and grossly intact and symmetrical.      LABS:                        12.9   12.6  )-----------( 276      ( 07 Mar 2017 10:18 )             38.2     07 Mar 2017 10:18    140    |  105    |  27     ----------------------------<  219    4.5     |  23     |  1.04     Ca    9.3        07 Mar 2017 10:18    TPro  8.0    /  Alb  3.8    /  TBili  0.5    /  DBili  x      /  AST  22     /  ALT  20     /  AlkPhos  113    07 Mar 2017 10:18          RADIOLOGY & ADDITIONAL STUDIES:    EXAM:  CT RENAL STONE HUNT                            PROCEDURE DATE:  03/07/2017        INTERPRETATION:  CLINICAL INFORMATION: Right flank pain    COMPARISON: 10/3/2016    PROCEDURE:    Serial axial sections through the abdomen and pelvis were obtained with   the patient in supine position without the use of intravenous contrast.    Coronal and sagittal 3-D reformats were created from the axial images.    FINDINGS:    Evaluation of solid organs and vascular structures is limited by lack of   intravenous contrast, which is standard for urinary calculus assessment   CT.     LOWER CHEST: Bibasilar atelectasis. 3 mm right lower lobe subpleural   nodule. Coronary artery calcifications.    ABDOMEN:  RIGHT KIDNEY AND URETER: 6-7 mm right ureteropelvic junction (UPJ)   calculus. Mild right hydronephrosis and right perinephric stranding. 2.1   cm hypodense right renal lesion, possibly cyst.  LEFT KIDNEY AND URETER: No calculi. No hydronephrosis.  LIVER: Within normal limits.  BILE DUCTS: Normalcaliber.  GALLBLADDER: No calcified gallstones. Normal caliber wall.  PANCREAS: Within normal limits.  SPLEEN: Within normal limits.  ADRENALS: Within normal limits.    PELVIS:  REPRODUCTIVE ORGANS: The uterus and adnexa are within normal limits.  URINARY BLADDER: Partially contracted.    BOWEL: Colonic diverticulosis. No bowel obstruction.  APPENDIX: Within normal limits.  PERITONEUM: No ascites or free air. No fluid collection.  VESSELS: Atherosclerotic changes.  RETROPERITONEUM: Within normal limits.  ABDOMINAL WALL: Tiny fat-containing umbilical hernia.  BONES: Spinal degenerative changes. Stable subcentimeter sclerotic lesion   within the spinous process of L3.    IMPRESSION:    6-7 mm RIGHT ureteropelvic junction (UPJ) calculus.  Mild RIGHT hydronephrosis.
67 year old female scheduled for surgery for recurrent nephrolithiasis.    No recent CV symptoms or complaints.    Allergies/Medications:   Allergies:        Allergies:  	No Known Allergies:     Home Medications:   * Patient Currently Takes Medications as of 07-Mar-2017 09:36 documented in Prescription Writer  · 	metFORMIN 500 mg oral tablet, extended release: Last Dose Taken:  , 1 tab(s) orally once a day  · 	Lipitor 80 mg oral tablet: Last Dose Taken:  , 1 tab(s) orally once a day (at bedtime)  · 	metoprolol succinate 50 mg oral tablet, extended release: Last Dose Taken:  , 1 tab(s) orally once a day  · 	clopidogrel 75 mg oral tablet: Last Dose Taken:  , 1 tab(s) orally once a day    . .    PMH/PSH/FH/SH:   Past Medical History:  CAD (coronary artery disease)    HTN (hypertension)    Kidney stones.    No tobacco or alcohol    Exam:    Vital Signs Last 24 Hrs  T(C): 36.8, Max: 36.8 (03-07 @ 15:31)  T(F): 98.3, Max: 98.3 (03-07 @ 15:31)  HR: 78 (77 - 82)  BP: 161/50 (154/78 - 179/56)  BP(mean): --  RR: 18 (17 - 19)  SpO2: 96% (96% - 97%)    jvp normal  carotids normal  lungs clear  no S3, rub  abdomen benign  no edema      Labs    Comprehensive Metabolic Panel (03.07.17 @ 10:18)    Sodium, Serum: 140 mmol/L    Potassium, Serum: 4.5 mmol/L    Chloride, Serum: 105 mmol/L    Carbon Dioxide, Serum: 23 mmol/L    Anion Gap, Serum: 12 mmol/L    Blood Urea Nitrogen, Serum: 27 mg/dL    Creatinine, Serum: 1.04 mg/dL    Glucose, Serum: 219 mg/dL    Calcium, Total Serum: 9.3 mg/dL    Protein Total, Serum: 8.0 gm/dL    Albumin, Serum: 3.8 g/dL    Bilirubin Total, Serum: 0.5 mg/dL    Alkaline Phosphatase, Serum: 113 U/L    Aspartate Aminotransferase (AST/SGOT): 22 U/L    Alanine Aminotransferase (ALT/SGPT): 20 U/L    eGFR if Non : 56: Interpretative comment  The units for eGFR are ml/min/1.73m2 (normalized body surface area). The  eGFR is calculated from a serum creatinine using the CKD-EPI equation.  Other variables required for calculation are race, age and sex. Among  patients w56: ith chronic kidney disease (CKD), the eGFR is useful in  determining the stage of disease according to KDOQI CKD classification.  All eGFR results are reported numerically with the following  interpretation.          GFR                    With56:                  Without     (ml/min/1.73 m2)    Kidney Damage       Kidney Damage        >= 90                    Stage 1                     Normal        60-89                    Stage 2                     Decreased GFR        :      Stage 3                     Stage 3        15-29                    Stage 4                     Stage 4        < 15                      Stage 5                     Stage 5  Each stage of CKD assumes that the associated GFR level has been in  eff56: ect for at least 3 months. Determination of stages one and two (with  eGFR > 59 ml/min/m2) requires estimation of kidney damage for at least 3  months as defined by structural or functional abnormalities.  Limitations: All estimates of GFR will be les56: s accurate for patients at  extremes of muscle mass (including but not limited to frail elderly,  critically ill, or cancer patients), those with unusual diets, and those  with conditions associated with reduced secretion or extrarenal  elimination of56:  creatinine. The eGFR equation is not recommended for use  in patients with unstable creatinine levels. mL/min/1.73M2    eGFR if African American: 65 mL/min/1.73M2    Comprehensive Metabolic Panel (03.07.17 @ 10:18)    Sodium, Serum: 140 mmol/L    Potassium, Serum: 4.5 mmol/L    Chloride, Serum: 105 mmol/L    Carbon Dioxide, Serum: 23 mmol/L    Anion Gap, Serum: 12 mmol/L    Blood Urea Nitrogen, Serum: 27 mg/dL    Creatinine, Serum: 1.04 mg/dL    Glucose, Serum: 219 mg/dL    Calcium, Total Serum: 9.3 mg/dL    Protein Total, Serum: 8.0 gm/dL    Albumin, Serum: 3.8 g/dL    Bilirubin Total, Serum: 0.5 mg/dL    Alkaline Phosphatase, Serum: 113 U/L    Aspartate Aminotransferase (AST/SGOT): 22 U/L    Alanine Aminotransferase (ALT/SGPT): 20 U/L    eGFR if Non : 56: Interpretative comment  The units for eGFR are ml/min/1.73m2 (normalized body surface area). The  eGFR is calculated from a serum creatinine using the CKD-EPI equation.  Other variables required for calculation are race, age and sex. Among  patients w56: ith chronic kidney disease (CKD), the eGFR is useful in  determining the stage of disease according to KDOQI CKD classification.  All eGFR results are reported numerically with the following  interpretation.          GFR                    With56:                  Without     (ml/min/1.73 m2)    Kidney Damage       Kidney Damage        >= 90                    Stage 1                     Normal        60-89                    Stage 2                     Decreased GFR        :      Stage 3                     Stage 3        15-29                    Stage 4                     Stage 4        < 15                      Stage 5                     Stage 5  Each stage of CKD assumes that the associated GFR level has been in  eff56: ect for at least 3 months. Determination of stages one and two (with  eGFR > 59 ml/min/m2) requires estimation of kidney damage for at least 3  months as defined by structural or functional abnormalities.  Limitations: All estimates of GFR will be les56: s accurate for patients at  extremes of muscle mass (including but not limited to frail elderly,  critically ill, or cancer patients), those with unusual diets, and those  with conditions associated with reduced secretion or extrarenal  elimination of56:  creatinine. The eGFR equation is not recommended for use  in patients with unstable creatinine levels. mL/min/1.73M2    eGFR if African American: 65 mL/min/1.73M2
Patient is a 67y old  Female who presents with a chief complaint of Renal colic (07 Mar 2017 15:25)      Reason For Consult: hyperglycemia     HPI:  67 years old female comes to ER for renal colic (07 Mar 2017 15:25)  finger sticks today are in 100's   had cystoscopy today , Nutrition poorly responsive   on Lispro sliding scale coverage with meals only       PAST MEDICAL & SURGICAL HISTORY:  Kidney stones  HTN (hypertension)  CAD (coronary artery disease)  Stented coronary artery      FAMILY HISTORY:        Social History:    MEDICATIONS  (STANDING):  lactated ringers. 1000milliLiter(s) IV Continuous <Continuous>  morphine  - Injectable 2milliGRAM(s) IV Push once  atorvastatin 80milliGRAM(s) Oral at bedtime  metoprolol succinate ER 50milliGRAM(s) Oral daily  sodium chloride 0.45%. 1000milliLiter(s) IV Continuous <Continuous>  cefTRIAXone   IVPB 1Gram(s) IV Intermittent every 24 hours  insulin lispro (HumaLOG) corrective regimen sliding scale  SubCutaneous three times a day before meals  dextrose 5%. 1000milliLiter(s) IV Continuous <Continuous>  dextrose 50% Injectable 12.5Gram(s) IV Push once  dextrose 50% Injectable 25Gram(s) IV Push once  dextrose 50% Injectable 25Gram(s) IV Push once    MEDICATIONS  (PRN):  diphenhydrAMINE   Capsule 25milliGRAM(s) Oral once PRN Rash and/or Itching  dextrose Gel 1Dose(s) Oral once PRN Blood Glucose LESS THAN 70 milliGRAM(s)/deciliter  glucagon  Injectable 1milliGRAM(s) IntraMuscular once PRN Glucose LESS THAN 70 milligrams/deciliter  acetaminophen   Tablet 650milliGRAM(s) Oral every 6 hours PRN For Temp greater than 38 C (100.4 F)  acetaminophen   Tablet. 650milliGRAM(s) Oral every 6 hours PRN Mild Pain (1 - 3)  morphine  - Injectable 2milliGRAM(s) IV Push every 4 hours PRN Moderate Pain (4 - 6)      REVIEW OF SYSTEMS:  CONSTITUTIONAL:  as per HPI  HEENT:  Eyes:  No diplopia or blurred vision. ENT:  No earache, sore throat or runny nose.  CARDIOVASCULAR:  No pressure, squeezing, strangling, tightness, heaviness or aching about the chest, neck, axilla or epigastrium.  RESPIRATORY:  No cough, shortness of breath, PND or orthopnea.  GASTROINTESTINAL:  No nausea, vomiting or diarrhea.  GENITOURINARY:  No dysuria, frequency or urgency. No Blood in urine  MUSCULOSKELETAL:  no joint aches, no muscle pain, myalgia  SKIN:  No change in skin, hair or nails.  NEUROLOGIC:  No paresthesias, fasciculations, seizures or weakness.  PSYCHIATRIC:  No disorder of thought or mood.  ENDOCRINE:  No heat or cold intolerance, polyuria or polydipsia. abnormal weight gain or loss, oral thrush  HEMATOLOGICAL:  No easy bruising or bleeding.     T(C): 36.7, Max: 37.7 (03-09 @ 00:04)  HR: 74 (74 - 97)  BP: 137/70 (125/61 - 153/77)  RR: 14 (14 - 24)  SpO2: 97% (94% - 98%)  Wt(kg): --    PHYSICAL EXAM:  GENERAL: NAD, well-groomed, well-developed  HEAD:  Atraumatic, Normocephalic  EYES: EOMI, PERRLA, conjunctiva and sclera clear  ENMT: No tonsillar erythema, exudates, or enlargement; Moist mucous membranes, Good dentition, No lesions  NECK: Supple, No JVD, Normal thyroid  NERVOUS SYSTEM:  Alert & Oriented X3, Good concentration; Motor Strength 5/5 B/L upper and lower extremities; DTRs 2+ intact and symmetric  CHEST/LUNG: Clear to percussion bilaterally; No rales, rhonchi, wheezing, or rubs  HEART: Regular rate and rhythm; No murmurs, rubs, or gallops  ABDOMEN: Soft, Nontender, Nondistended; Bowel sounds present  EXTREMITIES:  2+ Peripheral Pulses, No clubbing, cyanosis, or edema  LYMPH: No lymphadenopathy noted  SKIN: No rashes or lesions    CAPILLARY BLOOD GLUCOSE  114 (09 Mar 2017 16:19)  123 (09 Mar 2017 11:13)  143 (09 Mar 2017 07:48)  148 (08 Mar 2017 22:15)                            12.2   13.0  )-----------( 238      ( 09 Mar 2017 06:24 )             36.0       CMP:  03-09 @ 06:24  SGPT --  Albumin --   Alk Phos --   Anion Gap 10   SGOT --   Total Bili --   BUN 28   Calcium Total 8.2   CO2 24   Chloride 105   Creatinine 1.32   eGFR if AA 49   eGFR if non AA 42   Glucose 151   Potassium 4.3   Protein --   Sodium 139      Thyroid Function Tests:      Diabetes Tests:     Parathyroids:     Adrenals:       Radiology:

## 2017-03-10 LAB
ANION GAP SERPL CALC-SCNC: 9 MMOL/L — SIGNIFICANT CHANGE UP (ref 5–17)
BUN SERPL-MCNC: 16 MG/DL — SIGNIFICANT CHANGE UP (ref 7–23)
CALCIUM SERPL-MCNC: 7.7 MG/DL — LOW (ref 8.5–10.1)
CHLORIDE SERPL-SCNC: 104 MMOL/L — SIGNIFICANT CHANGE UP (ref 96–108)
CO2 SERPL-SCNC: 28 MMOL/L — SIGNIFICANT CHANGE UP (ref 22–31)
CREAT SERPL-MCNC: 0.81 MG/DL — SIGNIFICANT CHANGE UP (ref 0.5–1.3)
GLUCOSE SERPL-MCNC: 123 MG/DL — HIGH (ref 70–99)
HCT VFR BLD CALC: 32.7 % — LOW (ref 34.5–45)
HGB BLD-MCNC: 11.2 G/DL — LOW (ref 11.5–15.5)
MCHC RBC-ENTMCNC: 27.3 PG — SIGNIFICANT CHANGE UP (ref 27–34)
MCHC RBC-ENTMCNC: 34.2 GM/DL — SIGNIFICANT CHANGE UP (ref 32–36)
MCV RBC AUTO: 79.8 FL — LOW (ref 80–100)
PLATELET # BLD AUTO: 180 K/UL — SIGNIFICANT CHANGE UP (ref 150–400)
POTASSIUM SERPL-MCNC: 3.6 MMOL/L — SIGNIFICANT CHANGE UP (ref 3.5–5.3)
POTASSIUM SERPL-SCNC: 3.6 MMOL/L — SIGNIFICANT CHANGE UP (ref 3.5–5.3)
RBC # BLD: 4.1 M/UL — SIGNIFICANT CHANGE UP (ref 3.8–5.2)
RBC # FLD: 12.9 % — SIGNIFICANT CHANGE UP (ref 11–15)
SODIUM SERPL-SCNC: 141 MMOL/L — SIGNIFICANT CHANGE UP (ref 135–145)
WBC # BLD: 9.1 K/UL — SIGNIFICANT CHANGE UP (ref 3.8–10.5)
WBC # FLD AUTO: 9.1 K/UL — SIGNIFICANT CHANGE UP (ref 3.8–10.5)

## 2017-03-10 RX ADMIN — SODIUM CHLORIDE 75 MILLILITER(S): 9 INJECTION, SOLUTION INTRAVENOUS at 14:28

## 2017-03-10 RX ADMIN — Medication 2: at 11:39

## 2017-03-10 RX ADMIN — ATORVASTATIN CALCIUM 80 MILLIGRAM(S): 80 TABLET, FILM COATED ORAL at 21:52

## 2017-03-10 RX ADMIN — Medication 50 MILLIGRAM(S): at 05:16

## 2017-03-10 RX ADMIN — CEFTRIAXONE 100 GRAM(S): 500 INJECTION, POWDER, FOR SOLUTION INTRAMUSCULAR; INTRAVENOUS at 14:21

## 2017-03-10 NOTE — PROGRESS NOTE ADULT - SUBJECTIVE AND OBJECTIVE BOX
Patient is a 67y old  Female who presents with a chief complaint of Renal colic (07 Mar 2017 15:25)    MEDICAL PROBLEMS:  KIDNEY STONE ON RIGHT SIDE  KIDNEY STONE  Kidney stones  HTN (hypertension)  CAD (coronary artery disease)  Kidney stone on right side  Right flank pain  Type 2 diabetes mellitus without complication, unspecified long term insulin use status  Diabetes  CAD (coronary artery disease)  HTN (hypertension)  Kidney stone on right side  Renal colic on right side  Intractable pain    Ureteral stone  Hydronephrosis of right kidney  Renal colic on right side  Ureteroscopy with manipulation of right ureteral calculus  Ureteral stent placement, intraoperatively (not at tx)    INTERVAL HPI/OVERNIGHT EVENTS: S/P right ureteral stent. Leukocytosis and JENNIFER are improving    MEDICATIONS  (STANDING):  morphine  - Injectable 2milliGRAM(s) IV Push once  atorvastatin 80milliGRAM(s) Oral at bedtime  metoprolol succinate ER 50milliGRAM(s) Oral daily  sodium chloride 0.45%. 1000milliLiter(s) IV Continuous <Continuous>  cefTRIAXone   IVPB 1Gram(s) IV Intermittent every 24 hours  insulin lispro (HumaLOG) corrective regimen sliding scale  SubCutaneous three times a day before meals  dextrose 5%. 1000milliLiter(s) IV Continuous <Continuous>  dextrose 50% Injectable 12.5Gram(s) IV Push once  dextrose 50% Injectable 25Gram(s) IV Push once  dextrose 50% Injectable 25Gram(s) IV Push once    MEDICATIONS  (PRN):  diphenhydrAMINE   Capsule 25milliGRAM(s) Oral once PRN Rash and/or Itching  dextrose Gel 1Dose(s) Oral once PRN Blood Glucose LESS THAN 70 milliGRAM(s)/deciliter  glucagon  Injectable 1milliGRAM(s) IntraMuscular once PRN Glucose LESS THAN 70 milligrams/deciliter  acetaminophen   Tablet 650milliGRAM(s) Oral every 6 hours PRN For Temp greater than 38 C (100.4 F)  acetaminophen   Tablet. 650milliGRAM(s) Oral every 6 hours PRN Mild Pain (1 - 3)  morphine  - Injectable 2milliGRAM(s) IV Push every 4 hours PRN Moderate Pain (4 - 6)    Allergies    No Known Allergies    Intolerances      Vital Signs Last 24 Hrs  T(C): 36.7, Max: 36.9 (03-09 @ 22:19)  T(F): 98, Max: 98.4 (03-09 @ 22:19)  HR: 84 (74 - 93)  BP: 115/69 (115/69 - 152/76)  BP(mean): --  RR: 16 (14 - 24)  SpO2: 94% (94% - 98%)    PHYSICAL EXAM:  GENERAL: NAD, well-groomed, well-developed  HEAD:  Atraumatic, Normocephalic  EYES: EOMI, PERRLA, conjunctiva and sclera clear  ENMT: No tonsillar erythema, exudates, or enlargement; Moist mucous membranes, Good dentition, No lesions  NECK: Supple, No JVD, Normal thyroid  NERVOUS SYSTEM:  Alert & Oriented X3, Good concentration; Motor Strength 5/5 B/L upper and lower extremities; DTRs 2+ intact and symmetric  CHEST/LUNG: Clear to percussion bilaterally; No rales, rhonchi, wheezing, or rubs  HEART: Regular rate and rhythm; No murmurs, rubs, or gallops  ABDOMEN: Soft, Nontender, Nondistended; Bowel sounds present  EXTREMITIES:  2+ Peripheral Pulses, No clubbing, cyanosis, or edema  LYMPH: No lymphadenopathy noted  SKIN: No rashes or lesions    I & Os for current day (as of 03-10 @ 11:12)  =============================================  IN: 890 ml / OUT: 1300 ml / NET: -410 ml      LABS:                        11.2   9.1   )-----------( 180      ( 10 Mar 2017 06:29 )             32.7     10 Mar 2017 06:29    141    |  104    |  16     ----------------------------<  123    3.6     |  28     |  0.81     Ca    7.7        10 Mar 2017 06:29      PT/INR - ( 09 Mar 2017 06:24 )   PT: 11.3 sec;   INR: 1.01 ratio         PTT - ( 09 Mar 2017 06:24 )  PTT:27.0 sec    CAPILLARY BLOOD GLUCOSE  145 (10 Mar 2017 08:10)  122 (09 Mar 2017 22:36)  114 (09 Mar 2017 16:19)  123 (09 Mar 2017 11:13)    RADIOLOGY & ADDITIONAL TESTS:    Imaging Personally Reviewed:  [X] YES  [ ] NO    Consultant(s) Notes Reviewed:  [X] YES  [ ] NO    Care Discussed with Consultants/Other Providers [X] YES  [ ] NO

## 2017-03-10 NOTE — PROGRESS NOTE ADULT - SUBJECTIVE AND OBJECTIVE BOX
INTERVAL HISTORY:    comfortable postop  no complaints    PAST MEDICAL & SURGICAL HISTORY:  Kidney stones  HTN (hypertension)  CAD (coronary artery disease)  Stented coronary artery        MEDICATIONS:  MEDICATIONS  (STANDING):  morphine  - Injectable 2milliGRAM(s) IV Push once  atorvastatin 80milliGRAM(s) Oral at bedtime  metoprolol succinate ER 50milliGRAM(s) Oral daily  sodium chloride 0.45%. 1000milliLiter(s) IV Continuous <Continuous>  cefTRIAXone   IVPB 1Gram(s) IV Intermittent every 24 hours  insulin lispro (HumaLOG) corrective regimen sliding scale  SubCutaneous three times a day before meals  dextrose 5%. 1000milliLiter(s) IV Continuous <Continuous>  dextrose 50% Injectable 12.5Gram(s) IV Push once  dextrose 50% Injectable 25Gram(s) IV Push once  dextrose 50% Injectable 25Gram(s) IV Push once    MEDICATIONS  (PRN):  diphenhydrAMINE   Capsule 25milliGRAM(s) Oral once PRN Rash and/or Itching  dextrose Gel 1Dose(s) Oral once PRN Blood Glucose LESS THAN 70 milliGRAM(s)/deciliter  glucagon  Injectable 1milliGRAM(s) IntraMuscular once PRN Glucose LESS THAN 70 milligrams/deciliter  acetaminophen   Tablet 650milliGRAM(s) Oral every 6 hours PRN For Temp greater than 38 C (100.4 F)  acetaminophen   Tablet. 650milliGRAM(s) Oral every 6 hours PRN Mild Pain (1 - 3)  morphine  - Injectable 2milliGRAM(s) IV Push every 4 hours PRN Moderate Pain (4 - 6)      PHYSICAL EXAM:  T(F): 98, Max: 98.4 (03-09 @ 22:19)  HR: 82 (74 - 92)  BP: 136/75 (115/69 - 148/65)  RR: 16 (14 - 24)  SpO2: 93% (93% - 98%)  Wt(kg): --  I&O's Summary  I & Os for 24h ending 10 Mar 2017 07:00  =============================================  IN: 890 ml / OUT: 1300 ml / NET: -410 ml    I & Os for current day (as of 10 Mar 2017 16:21)  =============================================  IN: 450 ml / OUT: 0 ml / NET: 450 ml        PHYSICAL EXAM:    HEENT: sclera anicteric, conjunctiva normal  JVP normal  Carotids: normal upstroke  Lungs:  clear  Cor: normal S1S2, no S3, rub  Abdomen:  normal bs, nontender, nondistended, no organomegaly  Ext:  no edema  Neuro:  no focality       OTHER: 	    LABS:	     CARDIAC MARKERS:                                  11.2   9.1   )-----------( 180      ( 10 Mar 2017 06:29 )             32.7     10 Mar 2017 06:29    141    |  104    |  16     ----------------------------<  123    3.6     |  28     |  0.81     Ca    7.7        10 Mar 2017 06:29      proBNP:   Lipid Profile:   HgA1c:   TSH:   Test                            Date  WBC         9.1                 03-10 @ 06:29  RBC         4.10                03-10 @ 06:29  Hemoglobin  11.2                03-10 @ 06:29  Hematocrit  32.7                03-10 @ 06:29  Platelets   180                 03-10 @ 06:29  Creatinine  0.81                03-10 @ 06:29  Test                            Date  WBC         13.0                03-09 @ 06:24  RBC         4.54                03-09 @ 06:24  Hemoglobin  12.2                03-09 @ 06:24  Hematocrit  36.0                03-09 @ 06:24  Platelets   238                 03-09 @ 06:24  Creatinine  1.32                03-09 @ 06:24           Assessment:    CV status stable  usual meds  no acute issues

## 2017-03-10 NOTE — PROGRESS NOTE ADULT - PROBLEM SELECTOR PLAN 1
currently controlled  cont NEAL with meals  upon d/c if creatinine remains normal can be d/c on increased dose of metformin 1g bid

## 2017-03-10 NOTE — PROGRESS NOTE ADULT - ATTENDING COMMENTS
no pain , doing well, If no fever discharge tomorrow , to be followed as outpatient for ESWL of right renal pelvic stone

## 2017-03-10 NOTE — PROGRESS NOTE ADULT - SUBJECTIVE AND OBJECTIVE BOX
Postoperative Day #: 1 s/p Right ureteroscopy, stone manipulation, placement of stent.    Patient seen and examined @ bedside. Resting comfortably. Was sitting OOB in a chair.  No complaints offered.   Denies nausea and vomiting. Tolerating diet.  Flatus/BM. +  Denies chest pain, dyspnea, cough.    T(F): 97.8, Max: 98.4 (03-09 @ 22:19)  HR: 77 (74 - 93)  BP: 127/68 (125/61 - 152/76)  RR: 16 (14 - 24)  SpO2: 98% (94% - 98%)    CAPILLARY BLOOD GLUCOSE  145 (10 Mar 2017 08:10)  122 (09 Mar 2017 22:36)  114 (09 Mar 2017 16:19)  123 (09 Mar 2017 11:13)      PHYSICAL EXAM:  General: NAD, WDWN  Neuro:  Alert & oriented x 3  Abdomen: soft, NTND. Normactive BS  [place] cath just removed. Has not voided yet.      LABS:                        11.2   9.1   )-----------( 180      ( 10 Mar 2017 06:29 )             32.7     10 Mar 2017 06:29    141    |  104    |  16     ----------------------------<  123    3.6     |  28     |  0.81     Ca    7.7        10 Mar 2017 06:29      PT/INR - ( 09 Mar 2017 06:24 )   PT: 11.3 sec;   INR: 1.01 ratio         PTT - ( 09 Mar 2017 06:24 )  PTT:27.0 sec    I&O's Detail    I & Os for current day (as of 10 Mar 2017 08:21)  =============================================  IN:    sodium chloride 0.45%.: 800 ml    lactated ringers.: 90 ml    Total IN: 890 ml  ---------------------------------------------  OUT:    Indwelling Catheter - Urethral: 1300 ml    Total OUT: 1300 ml  ---------------------------------------------  Total NET: -410 ml      Impression: 67y Female s/p right ureteroscopy, stone manipulation, stent placement  - UTI - resolving         Plan:  -continue VTE prophylaxis   - continue IVAB   - continue medical f/u  -Increase activity , OOB, Ambulate  -await spontaneous voiding  -Await reevaluation & further   plans by Dr. Gallardo.

## 2017-03-10 NOTE — PROGRESS NOTE ADULT - SUBJECTIVE AND OBJECTIVE BOX
Patient is a 67y old  Female who presents with a chief complaint of Renal colic (07 Mar 2017 15:25)      INTERVAL HPI/OVERNIGHT EVENTS:  pt with no complaints  eating well, good appetite    MEDICATIONS  (STANDING):  morphine  - Injectable 2milliGRAM(s) IV Push once  atorvastatin 80milliGRAM(s) Oral at bedtime  metoprolol succinate ER 50milliGRAM(s) Oral daily  sodium chloride 0.45%. 1000milliLiter(s) IV Continuous <Continuous>  cefTRIAXone   IVPB 1Gram(s) IV Intermittent every 24 hours  insulin lispro (HumaLOG) corrective regimen sliding scale  SubCutaneous three times a day before meals  dextrose 5%. 1000milliLiter(s) IV Continuous <Continuous>  dextrose 50% Injectable 12.5Gram(s) IV Push once  dextrose 50% Injectable 25Gram(s) IV Push once  dextrose 50% Injectable 25Gram(s) IV Push once    MEDICATIONS  (PRN):  diphenhydrAMINE   Capsule 25milliGRAM(s) Oral once PRN Rash and/or Itching  dextrose Gel 1Dose(s) Oral once PRN Blood Glucose LESS THAN 70 milliGRAM(s)/deciliter  glucagon  Injectable 1milliGRAM(s) IntraMuscular once PRN Glucose LESS THAN 70 milligrams/deciliter  acetaminophen   Tablet 650milliGRAM(s) Oral every 6 hours PRN For Temp greater than 38 C (100.4 F)  acetaminophen   Tablet. 650milliGRAM(s) Oral every 6 hours PRN Mild Pain (1 - 3)  morphine  - Injectable 2milliGRAM(s) IV Push every 4 hours PRN Moderate Pain (4 - 6)      REVIEW OF SYSTEMS:  CONSTITUTIONAL: No fever, weight loss, or fatigue  RESPIRATORY: No cough, wheezing, chills or hemoptysis; No shortness of breath  CARDIOVASCULAR: No chest pain, palpitations, dizziness, or leg swelling  GASTROINTESTINAL: No abdominal or epigastric pain. No nausea, vomiting, or hematemesis; No diarrhea or constipation. No melena or hematochezia.  ENDOCRINE: No heat or cold intolerance; No hair loss      Vital Signs Last 24 Hrs  T(C): 36.7, Max: 36.9 (03-09 @ 22:19)  T(F): 98, Max: 98.4 (03-09 @ 22:19)  HR: 84 (74 - 93)  BP: 115/69 (115/69 - 152/76)  BP(mean): --  RR: 16 (14 - 24)  SpO2: 94% (94% - 98%)    PHYSICAL EXAM:  GENERAL: NAD, well-groomed, well-developed  CHEST/LUNG: Clear to percussion bilaterally; No rales, rhonchi, wheezing, or rubs  HEART: Regular rate and rhythm; No murmurs, rubs, or gallops        LABS:                        11.2   9.1   )-----------( 180      ( 10 Mar 2017 06:29 )             32.7     10 Mar 2017 06:29    141    |  104    |  16     ----------------------------<  123    3.6     |  28     |  0.81     Ca    7.7        10 Mar 2017 06:29      PT/INR - ( 09 Mar 2017 06:24 )   PT: 11.3 sec;   INR: 1.01 ratio         PTT - ( 09 Mar 2017 06:24 )  PTT:27.0 sec    CAPILLARY BLOOD GLUCOSE  145 (10 Mar 2017 08:10)  122 (09 Mar 2017 22:36)  114 (09 Mar 2017 16:19)    Lipid panel:               RADIOLOGY & ADDITIONAL TESTS:

## 2017-03-11 VITALS
HEART RATE: 83 BPM | DIASTOLIC BLOOD PRESSURE: 64 MMHG | RESPIRATION RATE: 18 BRPM | SYSTOLIC BLOOD PRESSURE: 130 MMHG | TEMPERATURE: 98 F | OXYGEN SATURATION: 96 %

## 2017-03-11 LAB
ANION GAP SERPL CALC-SCNC: 8 MMOL/L — SIGNIFICANT CHANGE UP (ref 5–17)
BASOPHILS # BLD AUTO: 0.1 K/UL — SIGNIFICANT CHANGE UP (ref 0–0.2)
BASOPHILS NFR BLD AUTO: 1.1 % — SIGNIFICANT CHANGE UP (ref 0–2)
BUN SERPL-MCNC: 17 MG/DL — SIGNIFICANT CHANGE UP (ref 7–23)
CALCIUM SERPL-MCNC: 7.8 MG/DL — LOW (ref 8.5–10.1)
CHLORIDE SERPL-SCNC: 107 MMOL/L — SIGNIFICANT CHANGE UP (ref 96–108)
CO2 SERPL-SCNC: 28 MMOL/L — SIGNIFICANT CHANGE UP (ref 22–31)
CREAT SERPL-MCNC: 0.72 MG/DL — SIGNIFICANT CHANGE UP (ref 0.5–1.3)
EOSINOPHIL # BLD AUTO: 0.3 K/UL — SIGNIFICANT CHANGE UP (ref 0–0.5)
EOSINOPHIL NFR BLD AUTO: 3.9 % — SIGNIFICANT CHANGE UP (ref 0–6)
GLUCOSE SERPL-MCNC: 138 MG/DL — HIGH (ref 70–99)
HCT VFR BLD CALC: 32.5 % — LOW (ref 34.5–45)
HGB BLD-MCNC: 11 G/DL — LOW (ref 11.5–15.5)
LYMPHOCYTES # BLD AUTO: 2.9 K/UL — SIGNIFICANT CHANGE UP (ref 1–3.3)
LYMPHOCYTES # BLD AUTO: 36.8 % — SIGNIFICANT CHANGE UP (ref 13–44)
MCHC RBC-ENTMCNC: 26.8 PG — LOW (ref 27–34)
MCHC RBC-ENTMCNC: 34 GM/DL — SIGNIFICANT CHANGE UP (ref 32–36)
MCV RBC AUTO: 78.8 FL — LOW (ref 80–100)
MONOCYTES # BLD AUTO: 0.9 K/UL — SIGNIFICANT CHANGE UP (ref 0–0.9)
MONOCYTES NFR BLD AUTO: 11.4 % — SIGNIFICANT CHANGE UP (ref 2–14)
NEUTROPHILS # BLD AUTO: 3.7 K/UL — SIGNIFICANT CHANGE UP (ref 1.8–7.4)
NEUTROPHILS NFR BLD AUTO: 46.8 % — SIGNIFICANT CHANGE UP (ref 43–77)
PLATELET # BLD AUTO: 194 K/UL — SIGNIFICANT CHANGE UP (ref 150–400)
POTASSIUM SERPL-MCNC: 3.7 MMOL/L — SIGNIFICANT CHANGE UP (ref 3.5–5.3)
POTASSIUM SERPL-SCNC: 3.7 MMOL/L — SIGNIFICANT CHANGE UP (ref 3.5–5.3)
RBC # BLD: 4.12 M/UL — SIGNIFICANT CHANGE UP (ref 3.8–5.2)
RBC # FLD: 12.4 % — SIGNIFICANT CHANGE UP (ref 11–15)
SODIUM SERPL-SCNC: 143 MMOL/L — SIGNIFICANT CHANGE UP (ref 135–145)
WBC # BLD: 7.9 K/UL — SIGNIFICANT CHANGE UP (ref 3.8–10.5)
WBC # FLD AUTO: 7.9 K/UL — SIGNIFICANT CHANGE UP (ref 3.8–10.5)

## 2017-03-11 RX ORDER — ACETAMINOPHEN 500 MG
2 TABLET ORAL
Qty: 0 | Refills: 0 | COMMUNITY
Start: 2017-03-11

## 2017-03-11 RX ORDER — CEPHALEXIN 500 MG
1 CAPSULE ORAL
Qty: 15 | Refills: 0 | OUTPATIENT
Start: 2017-03-11

## 2017-03-11 RX ORDER — CEPHALEXIN 500 MG
500 CAPSULE ORAL EVERY 8 HOURS
Qty: 0 | Refills: 0 | Status: DISCONTINUED | OUTPATIENT
Start: 2017-03-11 | End: 2017-03-11

## 2017-03-11 RX ADMIN — Medication 50 MILLIGRAM(S): at 06:09

## 2017-03-11 RX ADMIN — SODIUM CHLORIDE 75 MILLILITER(S): 9 INJECTION, SOLUTION INTRAVENOUS at 06:09

## 2017-03-11 NOTE — DISCHARGE NOTE ADULT - HOSPITAL COURSE
67 years old female was admitted for UTI due to right renal colic. Conditions are improved with right ureteral stent insertion and IV antibiotics.

## 2017-03-11 NOTE — DISCHARGE NOTE ADULT - PATIENT PORTAL LINK FT
“You can access the FollowHealth Patient Portal, offered by [facility], by registering with the following website: [email or web address]”

## 2017-03-11 NOTE — DISCHARGE NOTE ADULT - CARE PROVIDER_API CALL
Romeo Contreras), Internal Medicine  [address] [place] [address]  Phone: [phone]  Fax: [phone]

## 2017-03-11 NOTE — DISCHARGE NOTE ADULT - CARE PLAN
Principal Discharge DX:	Kidney stone on right side  Goal:	S/P stent  Instructions for follow-up, activity and diet:	Continue oral antibiotics  Secondary Diagnosis:	UTI (urinary tract infection)  Goal:	Oral Antibiotics  Secondary Diagnosis:	Hydronephrosis due to ureteral stricture  Goal:	Resolved  Secondary Diagnosis:	Type 2 diabetes mellitus without complication, unspecified long term insulin use status  Goal:	Meds  Secondary Diagnosis:	CAD (coronary artery disease)  Goal:	Meds

## 2017-03-11 NOTE — DISCHARGE NOTE ADULT - MEDICATION SUMMARY - MEDICATIONS TO TAKE
I will START or STAY ON the medications listed below when I get home from the hospital:    acetaminophen 325 mg oral tablet  -- 2 tab(s) by mouth every 6 hours, As needed, Mild Pain (1 - 3)  -- Indication: For Pain    metFORMIN 500 mg oral tablet, extended release  -- 1 tab(s) by mouth once a day  -- Indication: For DM    Lipitor 80 mg oral tablet  -- 1 tab(s) by mouth once a day (at bedtime)  -- Indication: For HLD    clopidogrel 75 mg oral tablet  -- 1 tab(s) by mouth once a day  -- Indication: For CAD (coronary artery disease)    metoprolol succinate 50 mg oral tablet, extended release  -- 1 tab(s) by mouth once a day  -- Indication: For HTN (hypertension)    cephalexin 500 mg oral capsule  -- 1 cap(s) by mouth every 8 hours  -- Indication: For UTI

## 2017-03-11 NOTE — DISCHARGE NOTE ADULT - SECONDARY DIAGNOSIS.
UTI (urinary tract infection) Hydronephrosis due to ureteral stricture Type 2 diabetes mellitus without complication, unspecified long term insulin use status CAD (coronary artery disease)

## 2017-03-15 DIAGNOSIS — E78.5 HYPERLIPIDEMIA, UNSPECIFIED: ICD-10-CM

## 2017-03-15 DIAGNOSIS — Z79.84 LONG TERM (CURRENT) USE OF ORAL HYPOGLYCEMIC DRUGS: ICD-10-CM

## 2017-03-15 DIAGNOSIS — Z95.5 PRESENCE OF CORONARY ANGIOPLASTY IMPLANT AND GRAFT: ICD-10-CM

## 2017-03-15 DIAGNOSIS — I25.10 ATHEROSCLEROTIC HEART DISEASE OF NATIVE CORONARY ARTERY WITHOUT ANGINA PECTORIS: ICD-10-CM

## 2017-03-15 DIAGNOSIS — I10 ESSENTIAL (PRIMARY) HYPERTENSION: ICD-10-CM

## 2017-03-15 DIAGNOSIS — N13.2 HYDRONEPHROSIS WITH RENAL AND URETERAL CALCULOUS OBSTRUCTION: ICD-10-CM

## 2017-03-15 DIAGNOSIS — Z87.442 PERSONAL HISTORY OF URINARY CALCULI: ICD-10-CM

## 2017-03-15 DIAGNOSIS — E66.9 OBESITY, UNSPECIFIED: ICD-10-CM

## 2017-03-15 DIAGNOSIS — N39.0 URINARY TRACT INFECTION, SITE NOT SPECIFIED: ICD-10-CM

## 2017-03-15 DIAGNOSIS — E11.65 TYPE 2 DIABETES MELLITUS WITH HYPERGLYCEMIA: ICD-10-CM

## 2017-03-15 DIAGNOSIS — E86.0 DEHYDRATION: ICD-10-CM

## 2017-06-09 PROBLEM — N20.0 CALCULUS OF KIDNEY: Chronic | Status: ACTIVE | Noted: 2017-03-07

## 2017-06-27 ENCOUNTER — OUTPATIENT (OUTPATIENT)
Dept: OUTPATIENT SERVICES | Facility: HOSPITAL | Age: 67
LOS: 1 days | Discharge: HOME | End: 2017-06-27
Payer: MEDICARE

## 2017-06-27 VITALS
OXYGEN SATURATION: 99 % | SYSTOLIC BLOOD PRESSURE: 139 MMHG | DIASTOLIC BLOOD PRESSURE: 63 MMHG | RESPIRATION RATE: 18 BRPM | HEART RATE: 70 BPM

## 2017-06-27 VITALS
OXYGEN SATURATION: 98 % | HEIGHT: 62 IN | WEIGHT: 194.01 LBS | DIASTOLIC BLOOD PRESSURE: 78 MMHG | RESPIRATION RATE: 18 BRPM | TEMPERATURE: 98 F | SYSTOLIC BLOOD PRESSURE: 151 MMHG

## 2017-06-27 DIAGNOSIS — N20.0 CALCULUS OF KIDNEY: ICD-10-CM

## 2017-06-27 PROCEDURE — 88300 SURGICAL PATH GROSS: CPT | Mod: 26

## 2017-06-27 RX ORDER — FENTANYL CITRATE 50 UG/ML
50 INJECTION INTRAVENOUS
Qty: 0 | Refills: 0 | Status: DISCONTINUED | OUTPATIENT
Start: 2017-06-27 | End: 2017-06-27

## 2017-06-27 RX ORDER — FUROSEMIDE 40 MG
10 TABLET ORAL ONCE
Qty: 0 | Refills: 0 | Status: COMPLETED | OUTPATIENT
Start: 2017-06-27 | End: 2017-06-27

## 2017-06-27 RX ORDER — MOXIFLOXACIN HYDROCHLORIDE TABLETS, 400 MG 400 MG/1
1 TABLET, FILM COATED ORAL
Qty: 6 | Refills: 0 | OUTPATIENT
Start: 2017-06-27 | End: 2017-06-30

## 2017-06-27 RX ORDER — SODIUM CHLORIDE 9 MG/ML
1000 INJECTION, SOLUTION INTRAVENOUS
Qty: 0 | Refills: 0 | Status: DISCONTINUED | OUTPATIENT
Start: 2017-06-27 | End: 2017-06-27

## 2017-06-27 RX ORDER — ONDANSETRON 8 MG/1
4 TABLET, FILM COATED ORAL ONCE
Qty: 0 | Refills: 0 | Status: DISCONTINUED | OUTPATIENT
Start: 2017-06-27 | End: 2017-06-27

## 2017-06-27 RX ADMIN — Medication 10 MILLIGRAM(S): at 11:13

## 2017-06-27 RX ADMIN — FENTANYL CITRATE 50 MICROGRAM(S): 50 INJECTION INTRAVENOUS at 11:24

## 2017-06-27 RX ADMIN — FENTANYL CITRATE 50 MICROGRAM(S): 50 INJECTION INTRAVENOUS at 11:36

## 2017-06-27 RX ADMIN — SODIUM CHLORIDE 75 MILLILITER(S): 9 INJECTION, SOLUTION INTRAVENOUS at 11:02

## 2017-06-27 NOTE — ASU PREOP CHECKLIST - MEDICAL/PEDIATRIC CLEARANCE ON MEDICAL RECORD
Triage: patient's mother notes diarrhea and vomiting since yesterday. No fevers.  Motrin given around 630pm. on chart

## 2017-06-27 NOTE — BRIEF OPERATIVE NOTE - PROCEDURE
Ureteroscopy with laser lithotripsy and stent placement  06/27/2017  right renal pelvic stone  Active  [facility]

## 2017-06-27 NOTE — H&P PST ADULT - PROBLEM SELECTOR PLAN 1
right ureteral stone and insertion of stent right ureteroscopy, removal of right ureteral stone and insertion of stent

## 2017-06-27 NOTE — ASU DISCHARGE PLAN (ADULT/PEDIATRIC). - NOTIFY
Persistent Nausea and Vomiting/Unable to Urinate/Excessive Diarrhea/Pain not relieved by Medications/Fever greater than 101/Increased Irritability or Sluggishness/Bleeding that does not stop/Inability to Tolerate Liquids or Foods

## 2017-06-27 NOTE — ASU DISCHARGE PLAN (ADULT/PEDIATRIC). - SPECIAL INSTRUCTIONS
Please call doctor's office for a follow up appointment to be seen in 7-10 days  No lifting  anything greater than 10 pounds. No strenuous activity. Rest as needed. Drink plenty of fluids to prevent constipation.  Restart plavix Wed. morning

## 2017-06-27 NOTE — H&P PST ADULT - HISTORY OF PRESENT ILLNESS
65 year old female with h/o CAD, stented, HTN with right ureteropelvic calculus. Patient is here today for right ureteroscopy, removal of right ureteral stone and insertion of stent. Patients Plavix and aspirin have been on hold.

## 2017-06-27 NOTE — ASU DISCHARGE PLAN (ADULT/PEDIATRIC). - MEDICATION SUMMARY - MEDICATIONS TO TAKE
I will START or STAY ON the medications listed below when I get home from the hospital:    acetaminophen 325 mg oral tablet  -- 2 tab(s) by mouth every 6 hours, As needed, Mild Pain (1 - 3)  -- Indication: For mild pain    metFORMIN 500 mg oral tablet, extended release  -- 1 tab(s) by mouth once a day  -- Indication: For diabetes    Lipitor 80 mg oral tablet  -- 1 tab(s) by mouth once a day (at bedtime)  -- Indication: For high cholesterol    clopidogrel 75 mg oral tablet  -- 1 tab(s) by mouth once a day  -- Indication: For prevent blood clots  RESTART WED MORNING.    metoprolol succinate 50 mg oral tablet, extended release  -- 1 tab(s) by mouth once a day  -- Indication: For high blood pressure    Cipro 500 mg oral tablet  -- 1 tab(s) by mouth every 12 hours MDD:2  -- Avoid prolonged or excessive exposure to direct and/or artificial sunlight while taking this medication.  Check with your doctor before becoming pregnant.  Do not take dairy products, antacids, or iron preparations within one hour of this medication.  Finish all this medication unless otherwise directed by prescriber.  Medication should be taken with plenty of water.    -- Indication: For antibiotic

## 2017-06-27 NOTE — BRIEF OPERATIVE NOTE - POST-OP DX
Ureteral stone with hydronephrosis  06/27/2017  right renal pelvis, with stent  Active  Christopher Gallardo

## 2017-06-28 LAB
CULTURE RESULTS: NO GROWTH — SIGNIFICANT CHANGE UP
SPECIMEN SOURCE: SIGNIFICANT CHANGE UP
SURGICAL PATHOLOGY FINAL REPORT - CH: SIGNIFICANT CHANGE UP

## 2017-06-29 LAB
CULTURE RESULTS: SIGNIFICANT CHANGE UP
SPECIMEN SOURCE: SIGNIFICANT CHANGE UP

## 2017-07-01 DIAGNOSIS — N20.1 CALCULUS OF URETER: ICD-10-CM

## 2017-07-01 DIAGNOSIS — N20.0 CALCULUS OF KIDNEY: ICD-10-CM

## 2017-07-01 DIAGNOSIS — Z79.84 LONG TERM (CURRENT) USE OF ORAL HYPOGLYCEMIC DRUGS: ICD-10-CM

## 2017-07-01 DIAGNOSIS — E11.9 TYPE 2 DIABETES MELLITUS WITHOUT COMPLICATIONS: ICD-10-CM

## 2017-07-01 DIAGNOSIS — I25.10 ATHEROSCLEROTIC HEART DISEASE OF NATIVE CORONARY ARTERY WITHOUT ANGINA PECTORIS: ICD-10-CM

## 2017-07-01 DIAGNOSIS — N21.0 CALCULUS IN BLADDER: ICD-10-CM

## 2017-07-01 DIAGNOSIS — I10 ESSENTIAL (PRIMARY) HYPERTENSION: ICD-10-CM

## 2017-07-01 DIAGNOSIS — Z98.61 CORONARY ANGIOPLASTY STATUS: ICD-10-CM

## 2017-07-01 DIAGNOSIS — Z79.02 LONG TERM (CURRENT) USE OF ANTITHROMBOTICS/ANTIPLATELETS: ICD-10-CM

## 2017-07-01 DIAGNOSIS — Z79.82 LONG TERM (CURRENT) USE OF ASPIRIN: ICD-10-CM

## 2017-07-01 LAB — COMPN STONE: SIGNIFICANT CHANGE UP

## 2017-07-06 ENCOUNTER — EMERGENCY (EMERGENCY)
Facility: HOSPITAL | Age: 67
LOS: 0 days | Discharge: HOME | End: 2017-07-07
Attending: EMERGENCY MEDICINE
Payer: MEDICARE

## 2017-07-06 VITALS
DIASTOLIC BLOOD PRESSURE: 70 MMHG | TEMPERATURE: 98 F | SYSTOLIC BLOOD PRESSURE: 192 MMHG | OXYGEN SATURATION: 99 % | HEIGHT: 62 IN | RESPIRATION RATE: 18 BRPM | WEIGHT: 194.01 LBS | HEART RATE: 76 BPM

## 2017-07-06 LAB
ALBUMIN SERPL ELPH-MCNC: 3.3 G/DL — SIGNIFICANT CHANGE UP (ref 3.3–5)
ALP SERPL-CCNC: 95 U/L — SIGNIFICANT CHANGE UP (ref 40–120)
ALT FLD-CCNC: 22 U/L — SIGNIFICANT CHANGE UP (ref 12–78)
ANION GAP SERPL CALC-SCNC: 8 MMOL/L — SIGNIFICANT CHANGE UP (ref 5–17)
APTT BLD: 28.4 SEC — SIGNIFICANT CHANGE UP (ref 27.5–37.4)
AST SERPL-CCNC: 9 U/L — LOW (ref 15–37)
BASOPHILS # BLD AUTO: 0.1 K/UL — SIGNIFICANT CHANGE UP (ref 0–0.2)
BASOPHILS NFR BLD AUTO: 1 % — SIGNIFICANT CHANGE UP (ref 0–2)
BILIRUB SERPL-MCNC: 0.3 MG/DL — SIGNIFICANT CHANGE UP (ref 0.2–1.2)
BUN SERPL-MCNC: 28 MG/DL — HIGH (ref 7–23)
CALCIUM SERPL-MCNC: 8.6 MG/DL — SIGNIFICANT CHANGE UP (ref 8.5–10.1)
CHLORIDE SERPL-SCNC: 109 MMOL/L — HIGH (ref 96–108)
CO2 SERPL-SCNC: 28 MMOL/L — SIGNIFICANT CHANGE UP (ref 22–31)
CREAT SERPL-MCNC: 0.73 MG/DL — SIGNIFICANT CHANGE UP (ref 0.5–1.3)
EOSINOPHIL # BLD AUTO: 0.2 K/UL — SIGNIFICANT CHANGE UP (ref 0–0.5)
EOSINOPHIL NFR BLD AUTO: 2.3 % — SIGNIFICANT CHANGE UP (ref 0–6)
GLUCOSE SERPL-MCNC: 154 MG/DL — HIGH (ref 70–99)
HCT VFR BLD CALC: 31.7 % — LOW (ref 34.5–45)
HGB BLD-MCNC: 11.3 G/DL — LOW (ref 11.5–15.5)
INR BLD: 1.01 RATIO — SIGNIFICANT CHANGE UP (ref 0.88–1.16)
LYMPHOCYTES # BLD AUTO: 2.7 K/UL — SIGNIFICANT CHANGE UP (ref 1–3.3)
LYMPHOCYTES # BLD AUTO: 30 % — SIGNIFICANT CHANGE UP (ref 13–44)
MCHC RBC-ENTMCNC: 27.5 PG — SIGNIFICANT CHANGE UP (ref 27–34)
MCHC RBC-ENTMCNC: 35.7 GM/DL — SIGNIFICANT CHANGE UP (ref 32–36)
MCV RBC AUTO: 77.1 FL — LOW (ref 80–100)
MONOCYTES # BLD AUTO: 0.7 K/UL — SIGNIFICANT CHANGE UP (ref 0–0.9)
MONOCYTES NFR BLD AUTO: 7.4 % — SIGNIFICANT CHANGE UP (ref 2–14)
NEUTROPHILS # BLD AUTO: 5.4 K/UL — SIGNIFICANT CHANGE UP (ref 1.8–7.4)
NEUTROPHILS NFR BLD AUTO: 59.4 % — SIGNIFICANT CHANGE UP (ref 43–77)
PLATELET # BLD AUTO: 257 K/UL — SIGNIFICANT CHANGE UP (ref 150–400)
POTASSIUM SERPL-MCNC: 3.7 MMOL/L — SIGNIFICANT CHANGE UP (ref 3.5–5.3)
POTASSIUM SERPL-SCNC: 3.7 MMOL/L — SIGNIFICANT CHANGE UP (ref 3.5–5.3)
PROT SERPL-MCNC: 7.2 GM/DL — SIGNIFICANT CHANGE UP (ref 6–8.3)
PROTHROM AB SERPL-ACNC: 11 SEC — SIGNIFICANT CHANGE UP (ref 9.8–12.7)
RBC # BLD: 4.11 M/UL — SIGNIFICANT CHANGE UP (ref 3.8–5.2)
RBC # FLD: 12.6 % — SIGNIFICANT CHANGE UP (ref 11–15)
SODIUM SERPL-SCNC: 145 MMOL/L — SIGNIFICANT CHANGE UP (ref 135–145)
WBC # BLD: 9 K/UL — SIGNIFICANT CHANGE UP (ref 3.8–10.5)
WBC # FLD AUTO: 9 K/UL — SIGNIFICANT CHANGE UP (ref 3.8–10.5)

## 2017-07-06 PROCEDURE — 99284 EMERGENCY DEPT VISIT MOD MDM: CPT

## 2017-07-06 RX ORDER — OXYMETAZOLINE HYDROCHLORIDE 0.5 MG/ML
2 SPRAY NASAL ONCE
Qty: 0 | Refills: 0 | Status: COMPLETED | OUTPATIENT
Start: 2017-07-06 | End: 2017-07-06

## 2017-07-06 RX ORDER — LABETALOL HCL 100 MG
10 TABLET ORAL ONCE
Qty: 0 | Refills: 0 | Status: COMPLETED | OUTPATIENT
Start: 2017-07-06 | End: 2017-07-06

## 2017-07-06 RX ADMIN — Medication 10 MILLIGRAM(S): at 22:21

## 2017-07-06 RX ADMIN — OXYMETAZOLINE HYDROCHLORIDE 2 SPRAY(S): 0.5 SPRAY NASAL at 21:41

## 2017-07-06 NOTE — ED PROVIDER NOTE - MEDICAL DECISION MAKING DETAILS
Patient presented for left nare epistaxis.  VSS.  Labs wnl.  Epistaxis controlled with afrin soaked gauze and persistent pressure.  Patient observed without further incident.  Recommend humidified air, BP control, avoid digital trauma or nose blowing.  ENT follow up given.  No suspicion for posterior bleed.  Discussed results and outcome of today's visit with the patient.  Patient advised to please follow up with their primary care doctor within the next 24 hours and return to the Emergency Department for worsening symptoms or any other concerns.  Patient advised that their doctor may call  to follow up on the specific results of the tests performed today in the emergency department.   Patient appears well on discharge. Patient presented for left nare epistaxis.  VSS.  Labs wnl.  Epistaxis controlled with afrin soaked gauze and persistent pressure.  Patient observed without further incident.  Recommend humidified air, BP control, avoid digital trauma or nose blowing.  Can continue plavix since bleed mild.  ENT follow up given.  No suspicion for posterior bleed.  Discussed results and outcome of today's visit with the patient.  Patient advised to please follow up with their primary care doctor within the next 24 hours and return to the Emergency Department for worsening symptoms or any other concerns.  Patient advised that their doctor may call  to follow up on the specific results of the tests performed today in the emergency department.   Patient appears well on discharge.

## 2017-07-06 NOTE — ED PROVIDER NOTE - ENMT, MLM
Airway patent, Right nasal mucosa clear, Left nare: +moderate bright red blood, possible anterior source identified. Mouth with normal mucosa.

## 2017-07-06 NOTE — ED PROVIDER NOTE - OBJECTIVE STATEMENT
Pertinent PMH/PSH/FHx/SHx and Review of Systems contained within:  Patient with history of CAD w/stent and on plavix, HTN, HLD, DM, kidney stone with right renal stent presents to the ED for nosebleed.  Patient was bending over to retrieve her bag when she started bleeding spontaneously from left nare.  Denies any headache or nasal pain.  Has not been holding pressure, says that "I didn't know to."  Bleed stopped by itself a few times,bu started back up.  BP significantly elevated in ER.     No fever/chills, No headache/photophobia/eye pain/changes in vision, No ear pain/sore throat/dysphagia, No chest pain/palpitations, no SOB/cough/wheeze/stridor, No abdominal pain, No N/V/D, no dysuria/frequency/discharge, No neck/back pain, no rash, no changes in neurological status/function.

## 2017-07-07 VITALS
TEMPERATURE: 98 F | RESPIRATION RATE: 16 BRPM | DIASTOLIC BLOOD PRESSURE: 76 MMHG | OXYGEN SATURATION: 100 % | SYSTOLIC BLOOD PRESSURE: 138 MMHG | HEART RATE: 68 BPM

## 2017-07-07 DIAGNOSIS — Z79.02 LONG TERM (CURRENT) USE OF ANTITHROMBOTICS/ANTIPLATELETS: ICD-10-CM

## 2017-07-07 DIAGNOSIS — I25.10 ATHEROSCLEROTIC HEART DISEASE OF NATIVE CORONARY ARTERY WITHOUT ANGINA PECTORIS: ICD-10-CM

## 2017-07-07 DIAGNOSIS — E11.9 TYPE 2 DIABETES MELLITUS WITHOUT COMPLICATIONS: ICD-10-CM

## 2017-07-07 DIAGNOSIS — R04.0 EPISTAXIS: ICD-10-CM

## 2017-07-07 DIAGNOSIS — Z79.1 LONG TERM (CURRENT) USE OF NON-STEROIDAL ANTI-INFLAMMATORIES (NSAID): ICD-10-CM

## 2017-07-07 DIAGNOSIS — I10 ESSENTIAL (PRIMARY) HYPERTENSION: ICD-10-CM

## 2017-10-17 ENCOUNTER — EMERGENCY (EMERGENCY)
Facility: HOSPITAL | Age: 67
LOS: 0 days | Discharge: HOME | End: 2017-10-17
Attending: EMERGENCY MEDICINE
Payer: MEDICARE

## 2017-10-17 VITALS
HEIGHT: 62 IN | OXYGEN SATURATION: 96 % | DIASTOLIC BLOOD PRESSURE: 90 MMHG | WEIGHT: 194.01 LBS | RESPIRATION RATE: 16 BRPM | HEART RATE: 86 BPM | TEMPERATURE: 98 F | SYSTOLIC BLOOD PRESSURE: 196 MMHG

## 2017-10-17 VITALS
DIASTOLIC BLOOD PRESSURE: 58 MMHG | OXYGEN SATURATION: 100 % | HEART RATE: 67 BPM | SYSTOLIC BLOOD PRESSURE: 152 MMHG | TEMPERATURE: 98 F | RESPIRATION RATE: 18 BRPM

## 2017-10-17 DIAGNOSIS — I25.10 ATHEROSCLEROTIC HEART DISEASE OF NATIVE CORONARY ARTERY WITHOUT ANGINA PECTORIS: ICD-10-CM

## 2017-10-17 DIAGNOSIS — N20.0 CALCULUS OF KIDNEY: ICD-10-CM

## 2017-10-17 DIAGNOSIS — E11.9 TYPE 2 DIABETES MELLITUS WITHOUT COMPLICATIONS: ICD-10-CM

## 2017-10-17 DIAGNOSIS — R10.9 UNSPECIFIED ABDOMINAL PAIN: ICD-10-CM

## 2017-10-17 DIAGNOSIS — I10 ESSENTIAL (PRIMARY) HYPERTENSION: ICD-10-CM

## 2017-10-17 LAB
ACETONE SERPL-MCNC: NEGATIVE — SIGNIFICANT CHANGE UP
ALBUMIN SERPL ELPH-MCNC: 3.7 G/DL — SIGNIFICANT CHANGE UP (ref 3.3–5)
ALP SERPL-CCNC: 132 U/L — HIGH (ref 40–120)
ALT FLD-CCNC: 28 U/L — SIGNIFICANT CHANGE UP (ref 12–78)
ANION GAP SERPL CALC-SCNC: 10 MMOL/L — SIGNIFICANT CHANGE UP (ref 5–17)
APPEARANCE UR: ABNORMAL
APTT BLD: 28.2 SEC — SIGNIFICANT CHANGE UP (ref 27.5–37.4)
AST SERPL-CCNC: 28 U/L — SIGNIFICANT CHANGE UP (ref 15–37)
BACTERIA # UR AUTO: ABNORMAL
BASOPHILS # BLD AUTO: 0.1 K/UL — SIGNIFICANT CHANGE UP (ref 0–0.2)
BASOPHILS NFR BLD AUTO: 0.5 % — SIGNIFICANT CHANGE UP (ref 0–2)
BILIRUB SERPL-MCNC: 0.4 MG/DL — SIGNIFICANT CHANGE UP (ref 0.2–1.2)
BILIRUB UR-MCNC: NEGATIVE — SIGNIFICANT CHANGE UP
BUN SERPL-MCNC: 17 MG/DL — SIGNIFICANT CHANGE UP (ref 7–23)
CALCIUM SERPL-MCNC: 9 MG/DL — SIGNIFICANT CHANGE UP (ref 8.5–10.1)
CHLORIDE SERPL-SCNC: 108 MMOL/L — SIGNIFICANT CHANGE UP (ref 96–108)
CO2 SERPL-SCNC: 24 MMOL/L — SIGNIFICANT CHANGE UP (ref 22–31)
COLOR SPEC: YELLOW — SIGNIFICANT CHANGE UP
CREAT SERPL-MCNC: 0.82 MG/DL — SIGNIFICANT CHANGE UP (ref 0.5–1.3)
DIFF PNL FLD: ABNORMAL
EOSINOPHIL # BLD AUTO: 0.1 K/UL — SIGNIFICANT CHANGE UP (ref 0–0.5)
EOSINOPHIL NFR BLD AUTO: 1 % — SIGNIFICANT CHANGE UP (ref 0–6)
EPI CELLS # UR: ABNORMAL
GLUCOSE SERPL-MCNC: 160 MG/DL — HIGH (ref 70–99)
GLUCOSE UR QL: 50 MG/DL
HCT VFR BLD CALC: 38.4 % — SIGNIFICANT CHANGE UP (ref 34.5–45)
HGB BLD-MCNC: 12.5 G/DL — SIGNIFICANT CHANGE UP (ref 11.5–15.5)
INR BLD: 0.97 RATIO — SIGNIFICANT CHANGE UP (ref 0.88–1.16)
KETONES UR-MCNC: NEGATIVE — SIGNIFICANT CHANGE UP
LEUKOCYTE ESTERASE UR-ACNC: NEGATIVE — SIGNIFICANT CHANGE UP
LYMPHOCYTES # BLD AUTO: 16 % — SIGNIFICANT CHANGE UP (ref 13–44)
LYMPHOCYTES # BLD AUTO: 2 K/UL — SIGNIFICANT CHANGE UP (ref 1–3.3)
MCHC RBC-ENTMCNC: 26.6 PG — LOW (ref 27–34)
MCHC RBC-ENTMCNC: 32.6 GM/DL — SIGNIFICANT CHANGE UP (ref 32–36)
MCV RBC AUTO: 81.5 FL — SIGNIFICANT CHANGE UP (ref 80–100)
MONOCYTES # BLD AUTO: 0.7 K/UL — SIGNIFICANT CHANGE UP (ref 0–0.9)
MONOCYTES NFR BLD AUTO: 5.3 % — SIGNIFICANT CHANGE UP (ref 2–14)
NEUTROPHILS # BLD AUTO: 9.7 K/UL — HIGH (ref 1.8–7.4)
NEUTROPHILS NFR BLD AUTO: 77.2 % — HIGH (ref 43–77)
NITRITE UR-MCNC: NEGATIVE — SIGNIFICANT CHANGE UP
PH UR: 5 — SIGNIFICANT CHANGE UP (ref 5–8)
PLATELET # BLD AUTO: 280 K/UL — SIGNIFICANT CHANGE UP (ref 150–400)
POTASSIUM SERPL-MCNC: 4.4 MMOL/L — SIGNIFICANT CHANGE UP (ref 3.5–5.3)
POTASSIUM SERPL-SCNC: 4.4 MMOL/L — SIGNIFICANT CHANGE UP (ref 3.5–5.3)
PROT SERPL-MCNC: 7.8 GM/DL — SIGNIFICANT CHANGE UP (ref 6–8.3)
PROT UR-MCNC: 30 MG/DL
PROTHROM AB SERPL-ACNC: 10.6 SEC — SIGNIFICANT CHANGE UP (ref 9.8–12.7)
RBC # BLD: 4.71 M/UL — SIGNIFICANT CHANGE UP (ref 3.8–5.2)
RBC # FLD: 14.4 % — SIGNIFICANT CHANGE UP (ref 11–15)
RBC CASTS # UR COMP ASSIST: SIGNIFICANT CHANGE UP /HPF (ref 0–4)
SODIUM SERPL-SCNC: 142 MMOL/L — SIGNIFICANT CHANGE UP (ref 135–145)
SP GR SPEC: 1.03 — HIGH (ref 1.01–1.02)
UROBILINOGEN FLD QL: NEGATIVE MG/DL — SIGNIFICANT CHANGE UP
WBC # BLD: 12.5 K/UL — HIGH (ref 3.8–10.5)
WBC # FLD AUTO: 12.5 K/UL — HIGH (ref 3.8–10.5)
WBC UR QL: SIGNIFICANT CHANGE UP

## 2017-10-17 PROCEDURE — 99284 EMERGENCY DEPT VISIT MOD MDM: CPT

## 2017-10-17 PROCEDURE — 74177 CT ABD & PELVIS W/CONTRAST: CPT | Mod: 26

## 2017-10-17 RX ORDER — METOPROLOL TARTRATE 50 MG
50 TABLET ORAL ONCE
Qty: 0 | Refills: 0 | Status: COMPLETED | OUTPATIENT
Start: 2017-10-17 | End: 2017-10-17

## 2017-10-17 RX ORDER — SODIUM CHLORIDE 9 MG/ML
1000 INJECTION INTRAMUSCULAR; INTRAVENOUS; SUBCUTANEOUS
Qty: 0 | Refills: 0 | Status: COMPLETED | OUTPATIENT
Start: 2017-10-17 | End: 2017-10-17

## 2017-10-17 RX ORDER — IBUPROFEN 200 MG
1 TABLET ORAL
Qty: 20 | Refills: 0
Start: 2017-10-17 | End: 2017-10-22

## 2017-10-17 RX ORDER — PANTOPRAZOLE SODIUM 20 MG/1
40 TABLET, DELAYED RELEASE ORAL ONCE
Qty: 0 | Refills: 0 | Status: COMPLETED | OUTPATIENT
Start: 2017-10-17 | End: 2017-10-17

## 2017-10-17 RX ORDER — METFORMIN HYDROCHLORIDE 850 MG/1
500 TABLET ORAL ONCE
Qty: 0 | Refills: 0 | Status: COMPLETED | OUTPATIENT
Start: 2017-10-17 | End: 2017-10-17

## 2017-10-17 RX ORDER — MOXIFLOXACIN HYDROCHLORIDE TABLETS, 400 MG 400 MG/1
1 TABLET, FILM COATED ORAL
Qty: 20 | Refills: 0 | OUTPATIENT
Start: 2017-10-17 | End: 2017-10-27

## 2017-10-17 RX ORDER — ONDANSETRON 8 MG/1
4 TABLET, FILM COATED ORAL ONCE
Qty: 0 | Refills: 0 | Status: COMPLETED | OUTPATIENT
Start: 2017-10-17 | End: 2017-10-17

## 2017-10-17 RX ORDER — TAMSULOSIN HYDROCHLORIDE 0.4 MG/1
1 CAPSULE ORAL
Qty: 7 | Refills: 0
Start: 2017-10-17 | End: 2017-10-24

## 2017-10-17 RX ORDER — CLOPIDOGREL BISULFATE 75 MG/1
75 TABLET, FILM COATED ORAL ONCE
Qty: 0 | Refills: 0 | Status: COMPLETED | OUTPATIENT
Start: 2017-10-17 | End: 2017-10-17

## 2017-10-17 RX ORDER — MORPHINE SULFATE 50 MG/1
4 CAPSULE, EXTENDED RELEASE ORAL ONCE
Qty: 0 | Refills: 0 | Status: DISCONTINUED | OUTPATIENT
Start: 2017-10-17 | End: 2017-10-17

## 2017-10-17 RX ORDER — CEFTRIAXONE 500 MG/1
1 INJECTION, POWDER, FOR SOLUTION INTRAMUSCULAR; INTRAVENOUS ONCE
Qty: 0 | Refills: 0 | Status: COMPLETED | OUTPATIENT
Start: 2017-10-17 | End: 2017-10-17

## 2017-10-17 RX ORDER — MOXIFLOXACIN HYDROCHLORIDE TABLETS, 400 MG 400 MG/1
1 TABLET, FILM COATED ORAL
Qty: 20 | Refills: 0
Start: 2017-10-17 | End: 2017-10-27

## 2017-10-17 RX ORDER — IOHEXOL 300 MG/ML
30 INJECTION, SOLUTION INTRAVENOUS ONCE
Qty: 0 | Refills: 0 | Status: COMPLETED | OUTPATIENT
Start: 2017-10-17 | End: 2017-10-17

## 2017-10-17 RX ADMIN — MORPHINE SULFATE 4 MILLIGRAM(S): 50 CAPSULE, EXTENDED RELEASE ORAL at 15:44

## 2017-10-17 RX ADMIN — CEFTRIAXONE 100 GRAM(S): 500 INJECTION, POWDER, FOR SOLUTION INTRAMUSCULAR; INTRAVENOUS at 17:52

## 2017-10-17 RX ADMIN — PANTOPRAZOLE SODIUM 40 MILLIGRAM(S): 20 TABLET, DELAYED RELEASE ORAL at 15:45

## 2017-10-17 RX ADMIN — Medication 50 MILLIGRAM(S): at 15:45

## 2017-10-17 RX ADMIN — SODIUM CHLORIDE 200 MILLILITER(S): 9 INJECTION INTRAMUSCULAR; INTRAVENOUS; SUBCUTANEOUS at 15:45

## 2017-10-17 RX ADMIN — CLOPIDOGREL BISULFATE 75 MILLIGRAM(S): 75 TABLET, FILM COATED ORAL at 15:45

## 2017-10-17 RX ADMIN — ONDANSETRON 4 MILLIGRAM(S): 8 TABLET, FILM COATED ORAL at 15:45

## 2017-10-17 RX ADMIN — MORPHINE SULFATE 4 MILLIGRAM(S): 50 CAPSULE, EXTENDED RELEASE ORAL at 16:23

## 2017-10-17 RX ADMIN — IOHEXOL 30 MILLILITER(S): 300 INJECTION, SOLUTION INTRAVENOUS at 15:10

## 2017-10-17 RX ADMIN — METFORMIN HYDROCHLORIDE 500 MILLIGRAM(S): 850 TABLET ORAL at 17:51

## 2017-10-17 NOTE — ED PROVIDER NOTE - PROGRESS NOTE DETAILS
Dr. Walker urologist is notified about pt's labs, ct renal reports and sts pt can be safely discharged with oral antibiotics after rocephine iv one dose and have pt follow up at this office tomorrow. Pt is alert and oriented x 3 sts the pain is much better now 2/10 Pt is given and explained all test reports and advised to see Dr. Walker at his office tomorrow and return if fever, chills, vomiting, nausea or persistent pain.

## 2017-10-17 NOTE — ED PROVIDER NOTE - OBJECTIVE STATEMENT
67 years old female walked in c/o right lower flank pain then started watery diarrhea for more than 5 times and right flank pain persist since 10:00 am this morning. Pt also sts she did not take her medications for hypertension and diabetes today last doses were yesterday. Pt denies headache, dizziness, blurred visions, light sensitivities, focal/distal weakness or numbness, cough, sob, chest pain, nausea, vomiting, fever, chills, abd pain, dysuria. 67 years old female walked in c/o right lower flank pain then started watery diarrhea for more than 5 times and right flank pain persist since 10:00 am this morning. Pt also sts she did not take her medications for hypertension and diabetes today last doses were yesterday. Pt denies headache, dizziness, blurred visions, light sensitivities, focal/distal weakness or numbness, cough, sob, chest pain, nausea, vomiting, fever, chills, abd pain, dysuria. Pt also sts she has a hx of renal stent.

## 2017-10-17 NOTE — ED PROVIDER NOTE - CONSTITUTIONAL, MLM
normal... Well appearing, well nourished, awake, alert, oriented to person, place, time/situation and in no apparent distress. Speaking in clear full sentences smiling not holding her abdomen smiling appears very comfortable sitting up in the stretcher in a bright light room

## 2017-10-17 NOTE — ED PROVIDER NOTE - MEDICAL DECISION MAKING DETAILS
hx, exam, labs hx, exam, labs, ct abd/pelvis hx, exam, labs, ct abd/pelvis, Dr. Walker urologist, reeval

## 2017-10-18 LAB
CULTURE RESULTS: SIGNIFICANT CHANGE UP
SPECIMEN SOURCE: SIGNIFICANT CHANGE UP

## 2019-09-25 ENCOUNTER — EMERGENCY (EMERGENCY)
Facility: HOSPITAL | Age: 69
LOS: 0 days | Discharge: HOME | End: 2019-09-25
Attending: EMERGENCY MEDICINE
Payer: MEDICARE

## 2019-09-25 VITALS
SYSTOLIC BLOOD PRESSURE: 158 MMHG | RESPIRATION RATE: 17 BRPM | TEMPERATURE: 100 F | HEIGHT: 62 IN | HEART RATE: 73 BPM | OXYGEN SATURATION: 98 % | WEIGHT: 166.89 LBS | DIASTOLIC BLOOD PRESSURE: 71 MMHG

## 2019-09-25 VITALS
TEMPERATURE: 98 F | SYSTOLIC BLOOD PRESSURE: 144 MMHG | HEART RATE: 65 BPM | RESPIRATION RATE: 17 BRPM | OXYGEN SATURATION: 100 % | DIASTOLIC BLOOD PRESSURE: 65 MMHG

## 2019-09-25 DIAGNOSIS — Z87.442 PERSONAL HISTORY OF URINARY CALCULI: ICD-10-CM

## 2019-09-25 DIAGNOSIS — R31.9 HEMATURIA, UNSPECIFIED: ICD-10-CM

## 2019-09-25 DIAGNOSIS — Z79.82 LONG TERM (CURRENT) USE OF ASPIRIN: ICD-10-CM

## 2019-09-25 DIAGNOSIS — Z95.5 PRESENCE OF CORONARY ANGIOPLASTY IMPLANT AND GRAFT: ICD-10-CM

## 2019-09-25 DIAGNOSIS — I10 ESSENTIAL (PRIMARY) HYPERTENSION: ICD-10-CM

## 2019-09-25 DIAGNOSIS — N39.0 URINARY TRACT INFECTION, SITE NOT SPECIFIED: ICD-10-CM

## 2019-09-25 DIAGNOSIS — I25.10 ATHEROSCLEROTIC HEART DISEASE OF NATIVE CORONARY ARTERY WITHOUT ANGINA PECTORIS: ICD-10-CM

## 2019-09-25 DIAGNOSIS — N93.9 ABNORMAL UTERINE AND VAGINAL BLEEDING, UNSPECIFIED: ICD-10-CM

## 2019-09-25 DIAGNOSIS — E11.9 TYPE 2 DIABETES MELLITUS WITHOUT COMPLICATIONS: ICD-10-CM

## 2019-09-25 LAB
ALBUMIN SERPL ELPH-MCNC: 3.6 G/DL — SIGNIFICANT CHANGE UP (ref 3.3–5)
ALP SERPL-CCNC: 85 U/L — SIGNIFICANT CHANGE UP (ref 40–120)
ALT FLD-CCNC: 24 U/L — SIGNIFICANT CHANGE UP (ref 12–78)
ANION GAP SERPL CALC-SCNC: 8 MMOL/L — SIGNIFICANT CHANGE UP (ref 5–17)
APPEARANCE UR: CLEAR — SIGNIFICANT CHANGE UP
AST SERPL-CCNC: 28 U/L — SIGNIFICANT CHANGE UP (ref 15–37)
BACTERIA # UR AUTO: ABNORMAL
BASOPHILS # BLD AUTO: 0.04 K/UL — SIGNIFICANT CHANGE UP (ref 0–0.2)
BASOPHILS NFR BLD AUTO: 0.6 % — SIGNIFICANT CHANGE UP (ref 0–2)
BILIRUB SERPL-MCNC: 0.5 MG/DL — SIGNIFICANT CHANGE UP (ref 0.2–1.2)
BILIRUB UR-MCNC: NEGATIVE — SIGNIFICANT CHANGE UP
BUN SERPL-MCNC: 21 MG/DL — SIGNIFICANT CHANGE UP (ref 7–23)
CALCIUM SERPL-MCNC: 8.7 MG/DL — SIGNIFICANT CHANGE UP (ref 8.5–10.1)
CHLORIDE SERPL-SCNC: 107 MMOL/L — SIGNIFICANT CHANGE UP (ref 96–108)
CO2 SERPL-SCNC: 26 MMOL/L — SIGNIFICANT CHANGE UP (ref 22–31)
COLOR SPEC: YELLOW — SIGNIFICANT CHANGE UP
CREAT SERPL-MCNC: 0.7 MG/DL — SIGNIFICANT CHANGE UP (ref 0.5–1.3)
DIFF PNL FLD: ABNORMAL
EOSINOPHIL # BLD AUTO: 0.16 K/UL — SIGNIFICANT CHANGE UP (ref 0–0.5)
EOSINOPHIL NFR BLD AUTO: 2.5 % — SIGNIFICANT CHANGE UP (ref 0–6)
EPI CELLS # UR: SIGNIFICANT CHANGE UP
GLUCOSE SERPL-MCNC: 102 MG/DL — HIGH (ref 70–99)
GLUCOSE UR QL: NEGATIVE MG/DL — SIGNIFICANT CHANGE UP
HCT VFR BLD CALC: 37.5 % — SIGNIFICANT CHANGE UP (ref 34.5–45)
HGB BLD-MCNC: 12 G/DL — SIGNIFICANT CHANGE UP (ref 11.5–15.5)
IMM GRANULOCYTES NFR BLD AUTO: 0.5 % — SIGNIFICANT CHANGE UP (ref 0–1.5)
KETONES UR-MCNC: NEGATIVE — SIGNIFICANT CHANGE UP
LEUKOCYTE ESTERASE UR-ACNC: NEGATIVE — SIGNIFICANT CHANGE UP
LYMPHOCYTES # BLD AUTO: 1.91 K/UL — SIGNIFICANT CHANGE UP (ref 1–3.3)
LYMPHOCYTES # BLD AUTO: 29.3 % — SIGNIFICANT CHANGE UP (ref 13–44)
MCHC RBC-ENTMCNC: 26.4 PG — LOW (ref 27–34)
MCHC RBC-ENTMCNC: 32 GM/DL — SIGNIFICANT CHANGE UP (ref 32–36)
MCV RBC AUTO: 82.6 FL — SIGNIFICANT CHANGE UP (ref 80–100)
MONOCYTES # BLD AUTO: 0.53 K/UL — SIGNIFICANT CHANGE UP (ref 0–0.9)
MONOCYTES NFR BLD AUTO: 8.1 % — SIGNIFICANT CHANGE UP (ref 2–14)
NEUTROPHILS # BLD AUTO: 3.84 K/UL — SIGNIFICANT CHANGE UP (ref 1.8–7.4)
NEUTROPHILS NFR BLD AUTO: 59 % — SIGNIFICANT CHANGE UP (ref 43–77)
NITRITE UR-MCNC: NEGATIVE — SIGNIFICANT CHANGE UP
NRBC # BLD: 0 /100 WBCS — SIGNIFICANT CHANGE UP (ref 0–0)
PH UR: 5 — SIGNIFICANT CHANGE UP (ref 5–8)
PLATELET # BLD AUTO: 251 K/UL — SIGNIFICANT CHANGE UP (ref 150–400)
POTASSIUM SERPL-MCNC: 4.2 MMOL/L — SIGNIFICANT CHANGE UP (ref 3.5–5.3)
POTASSIUM SERPL-SCNC: 4.2 MMOL/L — SIGNIFICANT CHANGE UP (ref 3.5–5.3)
PROT SERPL-MCNC: 7.6 GM/DL — SIGNIFICANT CHANGE UP (ref 6–8.3)
PROT UR-MCNC: NEGATIVE MG/DL — SIGNIFICANT CHANGE UP
RBC # BLD: 4.54 M/UL — SIGNIFICANT CHANGE UP (ref 3.8–5.2)
RBC # FLD: 13.5 % — SIGNIFICANT CHANGE UP (ref 10.3–14.5)
RBC CASTS # UR COMP ASSIST: ABNORMAL /HPF (ref 0–4)
SODIUM SERPL-SCNC: 141 MMOL/L — SIGNIFICANT CHANGE UP (ref 135–145)
SP GR SPEC: 1.01 — SIGNIFICANT CHANGE UP (ref 1.01–1.02)
UROBILINOGEN FLD QL: NEGATIVE MG/DL — SIGNIFICANT CHANGE UP
WBC # BLD: 6.51 K/UL — SIGNIFICANT CHANGE UP (ref 3.8–10.5)
WBC # FLD AUTO: 6.51 K/UL — SIGNIFICANT CHANGE UP (ref 3.8–10.5)
WBC UR QL: SIGNIFICANT CHANGE UP

## 2019-09-25 PROCEDURE — 99284 EMERGENCY DEPT VISIT MOD MDM: CPT

## 2019-09-25 RX ORDER — METFORMIN HYDROCHLORIDE 850 MG/1
1 TABLET ORAL
Qty: 0 | Refills: 0 | DISCHARGE

## 2019-09-25 RX ORDER — EZETIMIBE 10 MG/1
1 TABLET ORAL
Qty: 0 | Refills: 0 | DISCHARGE

## 2019-09-25 RX ORDER — ATORVASTATIN CALCIUM 80 MG/1
1 TABLET, FILM COATED ORAL
Qty: 0 | Refills: 0 | DISCHARGE

## 2019-09-25 RX ORDER — ASPIRIN/CALCIUM CARB/MAGNESIUM 324 MG
0 TABLET ORAL
Qty: 0 | Refills: 0 | DISCHARGE

## 2019-09-25 RX ORDER — ALLOPURINOL 300 MG
1 TABLET ORAL
Qty: 0 | Refills: 0 | DISCHARGE

## 2019-09-25 RX ORDER — CEPHALEXIN 500 MG
1 CAPSULE ORAL
Qty: 9 | Refills: 0
Start: 2019-09-25 | End: 2019-09-27

## 2019-09-25 NOTE — ED PROVIDER NOTE - CROS ED CONS ALL NEG
negative... [facility]-[phone]; [facility][phone]

## 2019-09-25 NOTE — ED PROVIDER NOTE - CLINICAL SUMMARY MEDICAL DECISION MAKING FREE TEXT BOX
Ddx: UTI/ dysfunctional uterine bleeding less likely given only when she urinates  Plan: cbc, cmp, ua, reassess

## 2019-09-25 NOTE — ED PROVIDER NOTE - PATIENT PORTAL LINK FT
You can access the FollowMyHealth Patient Portal offered by [facility] by registering at the following website: [email or web address]. By joining [facility]’s FollowMyHealth portal, you will also be able to view your health information using other applications (apps) compatible with our system.

## 2019-09-25 NOTE — ED ADULT NURSE NOTE - PMH
CAD (coronary artery disease)    Diabetes    HTN (hypertension)    Kidney stones    Stented coronary artery

## 2019-09-25 NOTE — ED ADULT NURSE NOTE - NSIMPLEMENTINTERV_GEN_ALL_ED
Implemented All Fall with Harm Risk Interventions:  [place] to call system. Call bell, personal items and telephone within reach. Instruct patient to call for assistance. Room bathroom lighting operational. Non-slip footwear when patient is off stretcher. Physically safe environment: no spills, clutter or unnecessary equipment. Stretcher in lowest position, wheels locked, appropriate side rails in place. Provide visual cue, wrist band, yellow gown, etc. Monitor gait and stability. Monitor for mental status changes and reorient to person, place, and time. Review medications for side effects contributing to fall risk. Reinforce activity limits and safety measures with patient and family. Provide visual clues: red socks.

## 2019-09-25 NOTE — ED ADULT NURSE NOTE - OBJECTIVE STATEMENT
received er bed p c/o blood noted on tissue after voiding since yesterday slight bleeding noted on panty liner today denies dysuria or difficulty urinating denies abdominal or flank pain denies fever/chills denies n/v

## 2019-09-25 NOTE — ED PROVIDER NOTE - OBJECTIVE STATEMENT
Pt is a 70 yo lady with a pmhx of HTN, HL, DM2, cad w stent on asa who presents to the ED with hematuria. It stated last night. Has blood after she urinates. Not bleeding vaginally otherwise. No lightheadedness. No fevers, no dysuria.

## 2019-09-26 PROBLEM — E11.9 TYPE 2 DIABETES MELLITUS WITHOUT COMPLICATIONS: Chronic | Status: ACTIVE | Noted: 2017-10-17

## 2019-09-26 LAB
CULTURE RESULTS: SIGNIFICANT CHANGE UP
SPECIMEN SOURCE: SIGNIFICANT CHANGE UP

## 2019-10-04 PROBLEM — Z00.00 ENCOUNTER FOR PREVENTIVE HEALTH EXAMINATION: Status: ACTIVE | Noted: 2019-10-04

## 2020-08-02 NOTE — DISCHARGE NOTE ADULT - NS AS DC STROKE DX YN
MD updated on pt's current status and VS.  Pt unwilling to stay for further observation and tx at this time.  MD aware.   no

## 2020-11-02 NOTE — ED ADULT TRIAGE NOTE - NS ED NURSE DIRECT TO ROOM YN
Need note to return to work. Patient reports having surgery on right foot. Denies fevers, pus, redness to site. No

## 2021-05-26 NOTE — H&P PST ADULT - NEUROLOGICAL
Occupational Therapy Daily Treatment    Visit Count: 4     Referred by: Wilbert Muñoz*; Next provider visit (if known/scheduled): TBD  Medical Diagnosis (from order):   Diagnosis Information             Diagnosis      457.1 (ICD-9-CM) - I89.0 (ICD-10-CM) - Lymphedema                      Treatment Diagnosis: lower extremity lymphedema with increased heaviness, increased girth, decreased active range of motion, decreased strength, tissue abnormalities     Diagnosis Precautions: on blood thinner, history of cellulitis    SUBJECTIVE   Patient arrived ambulatory with walker and wearing his Jobst knee high stocking on his left leg.   Current Pain (0-10 scale): 0  Functional Change: patient reports he has a home lymphedema pump at home and uses it every other night.     OBJECTIVE      Lymphedema Measurements:  Lower Quadrant Lymphedema Circumference: (cm)  Date  5/3 5/24            Landmarks left left          20 cm prox SPB               15 cm prox SPB               10 cm prox SPB               SPB 68  66.4           10 cm dist SPB 74.6  72.9           20 cm dist SPB 73.4  73.2           30 cm dist SPB 58.5  58.2           35 cm dist SPB 51.8  49.5           40 cm dist SPB 40.7  39.4           Ankle (malleoli level) 40  37.5           Calcaneous 38.9  38           5 cm prox to 1st web space 29  28.7           Metatarsal phalangeal 27.6  27.2           Total 502.5  491            % loss/gain               Key: prox=proximal; diff=difference; SPB=superior patellar border, dist=distal           Treatment   Manual Therapy:   Therapist completed manual lymphatic drainage for the bilateral lower extremity sequence following proximal clearing of the upper body (cervical, clavicular, abdominal, axillary, and inguinal nodes) and popliteal nodes to improve drainage of lower extremity fluid for reduced limb girth.      Therapist applied multi-layer Comprilan short stretch bandaging (one 10 cm bandage and two 12 cm  bandage) to patient's left lower extremity from the metatarsals to the distal popliteal crease for fluid reduction. Prior to application of the short stretch bandages, lotion, stockinet and open cell foam were applied. Reviewed with patient purpose for compression bandaging, wearing schedule-48 hours, wearing precautions, and laundering care.    Skilled input: as detailed above    Home Program:   To wear his Jobst knee 20 -30 mmHg high stockings knee high after removing the bandages, out of bed wear, while in between sessions to maintain limb girth.     Compression bandaging:   Patient instructed in:  1. Wearing schedule: wear 48 hours as tolerated; remove if significant discomfort occurs  2. Indications for removal  3. Bandage care    Proximal to distal to promote lymphatic drainage in proximal to distal and reverse order: node clearing, diaphragmatic breathing, hip AROM, knee ROM, ankle AROM   Written instructions provided:   For Your [facility] ([id number]): Lower Extremity Lymphedema Exercises     Writer verbally educated the patient and received verbal consent from the patient on hand placement, positioning of patient, and techniques to be performed today including clothing adjustments for techniques, therapist position for techniques, hand placement and palpation for techniques as described above and how they are pertinent to the patient's plan of care.       Suggestions for next session as indicated: progress per plan of care, manual lymph drainage, exercises to promote lymphmatic flow, compression bandaging, continue patient instruction        ASSESSMENT   Decreased girth per objective measurements. Patient is progressing toward meeting his goals. Continue with plan of care.     Pain after treatment (patient reported, 0-10 scale): not rated  Result of above outlined education: Verbalizes understanding    Procedures and total treatment time documented in Time Entry flowsheet.   negative Alert & oriented; no sensory, motor or coordination deficits, normal reflexes

## 2023-04-20 NOTE — ED ADULT TRIAGE NOTE - SPO2 (%)
What Type Of Note Output Would You Prefer (Optional)?: Standard Output
Hpi Title: Evaluation of Skin Lesions
98

## 2023-06-13 NOTE — ED ADULT TRIAGE NOTE - BSA (M2)
If Crescencio Moss called for [facility] set up an appointment for [facility] to call her  CITLALY on file 
Mack Gilford, [facility] [phone] called and meeting set up and blocked in schedule for 6/15/2023 at 5:00 pm (only time available for her) with Rosy Zurita PA-C 
1.89

## 2025-06-03 ENCOUNTER — INPATIENT (INPATIENT)
Facility: HOSPITAL | Age: 75
LOS: 7 days | Discharge: HOME | End: 2025-06-11
Attending: HOSPITALIST | Admitting: HOSPITALIST
Payer: MEDICARE

## 2025-06-03 ENCOUNTER — TRANSCRIPTION ENCOUNTER (OUTPATIENT)
Age: 75
End: 2025-06-03

## 2025-06-03 VITALS — OXYGEN SATURATION: 96 % | HEIGHT: 62 IN | WEIGHT: 167.99 LBS | HEART RATE: 105 BPM

## 2025-06-03 PROBLEM — Z95.5 PRESENCE OF CORONARY ANGIOPLASTY IMPLANT AND GRAFT: Chronic | Status: ACTIVE | Noted: 2019-09-25

## 2025-06-03 LAB
ALBUMIN SERPL ELPH-MCNC: 3.5 G/DL — SIGNIFICANT CHANGE UP (ref 3.3–5)
ALP SERPL-CCNC: 86 U/L — SIGNIFICANT CHANGE UP (ref 40–120)
ALT FLD-CCNC: 19 U/L — SIGNIFICANT CHANGE UP (ref 12–78)
ANION GAP SERPL CALC-SCNC: 6 MMOL/L — SIGNIFICANT CHANGE UP (ref 5–17)
AST SERPL-CCNC: 13 U/L — LOW (ref 15–37)
BASOPHILS # BLD AUTO: 0.05 K/UL — SIGNIFICANT CHANGE UP (ref 0–0.2)
BASOPHILS NFR BLD AUTO: 0.5 % — SIGNIFICANT CHANGE UP (ref 0–2)
BILIRUB SERPL-MCNC: 0.6 MG/DL — SIGNIFICANT CHANGE UP (ref 0.2–1.2)
BUN SERPL-MCNC: 26 MG/DL — HIGH (ref 7–23)
CALCIUM SERPL-MCNC: 9.4 MG/DL — SIGNIFICANT CHANGE UP (ref 8.5–10.1)
CHLORIDE SERPL-SCNC: 107 MMOL/L — SIGNIFICANT CHANGE UP (ref 96–108)
CO2 SERPL-SCNC: 27 MMOL/L — SIGNIFICANT CHANGE UP (ref 22–31)
CREAT SERPL-MCNC: 0.82 MG/DL — SIGNIFICANT CHANGE UP (ref 0.5–1.3)
EGFR: 75 ML/MIN/1.73M2 — SIGNIFICANT CHANGE UP
EGFR: 75 ML/MIN/1.73M2 — SIGNIFICANT CHANGE UP
EOSINOPHIL # BLD AUTO: 0.18 K/UL — SIGNIFICANT CHANGE UP (ref 0–0.5)
EOSINOPHIL NFR BLD AUTO: 1.6 % — SIGNIFICANT CHANGE UP (ref 0–6)
GLUCOSE SERPL-MCNC: 110 MG/DL — HIGH (ref 70–99)
HCT VFR BLD CALC: 36.1 % — SIGNIFICANT CHANGE UP (ref 34.5–45)
HGB BLD-MCNC: 11.7 G/DL — SIGNIFICANT CHANGE UP (ref 11.5–15.5)
IMM GRANULOCYTES NFR BLD AUTO: 0.3 % — SIGNIFICANT CHANGE UP (ref 0–0.9)
LYMPHOCYTES # BLD AUTO: 1.99 K/UL — SIGNIFICANT CHANGE UP (ref 1–3.3)
LYMPHOCYTES # BLD AUTO: 18.1 % — SIGNIFICANT CHANGE UP (ref 13–44)
MCHC RBC-ENTMCNC: 25.8 PG — LOW (ref 27–34)
MCHC RBC-ENTMCNC: 32.4 G/DL — SIGNIFICANT CHANGE UP (ref 32–36)
MCV RBC AUTO: 79.5 FL — LOW (ref 80–100)
MONOCYTES # BLD AUTO: 0.97 K/UL — HIGH (ref 0–0.9)
MONOCYTES NFR BLD AUTO: 8.8 % — SIGNIFICANT CHANGE UP (ref 2–14)
NEUTROPHILS # BLD AUTO: 7.78 K/UL — HIGH (ref 1.8–7.4)
NEUTROPHILS NFR BLD AUTO: 70.7 % — SIGNIFICANT CHANGE UP (ref 43–77)
NRBC BLD AUTO-RTO: 0 /100 WBCS — SIGNIFICANT CHANGE UP (ref 0–0)
PLATELET # BLD AUTO: 296 K/UL — SIGNIFICANT CHANGE UP (ref 150–400)
POTASSIUM SERPL-MCNC: 3.8 MMOL/L — SIGNIFICANT CHANGE UP (ref 3.5–5.3)
POTASSIUM SERPL-SCNC: 3.8 MMOL/L — SIGNIFICANT CHANGE UP (ref 3.5–5.3)
PROT SERPL-MCNC: 7.7 GM/DL — SIGNIFICANT CHANGE UP (ref 6–8.3)
RBC # BLD: 4.54 M/UL — SIGNIFICANT CHANGE UP (ref 3.8–5.2)
RBC # FLD: 13.5 % — SIGNIFICANT CHANGE UP (ref 10.3–14.5)
SODIUM SERPL-SCNC: 140 MMOL/L — SIGNIFICANT CHANGE UP (ref 135–145)
WBC # BLD: 11 K/UL — HIGH (ref 3.8–10.5)
WBC # FLD AUTO: 11 K/UL — HIGH (ref 3.8–10.5)

## 2025-06-03 PROCEDURE — 99285 EMERGENCY DEPT VISIT HI MDM: CPT

## 2025-06-03 PROCEDURE — 93970 EXTREMITY STUDY: CPT | Mod: 26

## 2025-06-03 RX ORDER — CEFTRIAXONE 500 MG/1
1000 INJECTION, POWDER, FOR SOLUTION INTRAMUSCULAR; INTRAVENOUS ONCE
Refills: 0 | Status: COMPLETED | OUTPATIENT
Start: 2025-06-03 | End: 2025-06-03

## 2025-06-03 RX ORDER — CEFTRIAXONE 500 MG/1
1000 INJECTION, POWDER, FOR SOLUTION INTRAMUSCULAR; INTRAVENOUS ONCE
Refills: 0 | Status: DISCONTINUED | OUTPATIENT
Start: 2025-06-03 | End: 2025-06-03

## 2025-06-03 RX ORDER — KETOROLAC TROMETHAMINE 30 MG/ML
15 INJECTION, SOLUTION INTRAMUSCULAR; INTRAVENOUS ONCE
Refills: 0 | Status: DISCONTINUED | OUTPATIENT
Start: 2025-06-03 | End: 2025-06-03

## 2025-06-03 RX ADMIN — CEFTRIAXONE 1000 MILLIGRAM(S): 500 INJECTION, POWDER, FOR SOLUTION INTRAMUSCULAR; INTRAVENOUS at 21:50

## 2025-06-03 RX ADMIN — KETOROLAC TROMETHAMINE 15 MILLIGRAM(S): 30 INJECTION, SOLUTION INTRAMUSCULAR; INTRAVENOUS at 23:19

## 2025-06-03 NOTE — ED PROVIDER NOTE - PHYSICAL EXAMINATION
General: No acute distress, mentation at baseline,  well nourished, well developed  HEENT: NCAT, Neck supple without meningismus, PERRL, no conjunctival injection  Lungs: CTAB, No wheeze or crackles, No retractions, No increased work of breathing  Heart: S1S2 RRR, No M/R/G, Pules equal Bilaterally in upper and lower extremities distally  Abd: soft, NT/ND, No guarding, No rebound.  No hernias, no palpable masses.  Extrem: circumferential erythema tenderness warmth of LLE from foot to mid shin w no open wounds no creputis or bullae   Skin: No rash noted, warm dry.  Neuro:  Grossly normal.  No difficulty ambulating. No focal deficits.  Psychiatric: Appropriate mood and affect.

## 2025-06-03 NOTE — ED PROVIDER NOTE - CLINICAL SUMMARY MEDICAL DECISION MAKING FREE TEXT BOX
75-year-old female with past ministry of CAD hypertension diabetes presenting with left leg pain for 3 weeks.  Patient noticed sign of skin infection left calf has had cellulitis in the past.  Went to urgent care and was started on oral antibiotics but does not remember name, patient states that she has gone to urgent care for the treatment of this infection but has only worsened despite oral antibiotics.  Went to urgent care again today and was told to be admitted for IV antibiotics.  And denies fever or chills.  Exam findings below low special for necrotizing fasciitis versus abscess, likely cellulitis.  Ultrasound ordered by fast-track team to rule out DVT.  Labs ultrasound negative, patient given IV ceftriaxone initially by fast-track doctor, added IV clindamycin for MRSA coverage.  Will admit to medicine

## 2025-06-03 NOTE — ED ADULT NURSE NOTE - OBJECTIVE STATEMENT
Pt presents to ed for B/l leg swelling. Chronic issue for patient that got worse today when pts leg opened up and started ozing fluid. Noted abrasion/opening on skin of LLE foul smelling and noted sanguinous fluid

## 2025-06-03 NOTE — ED PROVIDER NOTE - NSICDXPASTMEDICALHX_GEN_ALL_CORE_FT
PAST MEDICAL HISTORY:  CAD (coronary artery disease)     Diabetes     HTN (hypertension)     Kidney stones     Stented coronary artery

## 2025-06-03 NOTE — ED PROVIDER NOTE - RAPID ASSESSMENT
75-year-old female past medical history of hypertension, hyperlipidemia, diabetes, CAD with stent placement presenting with lower extremity cellulitis. Patient evaluated by urgent care 3 weeks ago and placed on oral antibiotics. Today patient was seen by urgent care again with worsening lower extremity cellulitis with skin breakdown. Patient initially seen in [facility] and upgraded to main emergency department. Patient will require admission for failure of outpatient antibiotics and cellulitis with skin breakdown.

## 2025-06-03 NOTE — ED ADULT TRIAGE NOTE - CHIEF COMPLAINT QUOTE
referred from urgent care for cellulitis in left leg x months, worsening x1 week. pt has had multiple rounds of abx outpatient with no relief.  hx of dm, HLD, HTN, CAD. nkda

## 2025-06-04 LAB
GLUCOSE BLDC GLUCOMTR-MCNC: 113 MG/DL — HIGH (ref 70–99)
GLUCOSE BLDC GLUCOMTR-MCNC: 128 MG/DL — HIGH (ref 70–99)
GLUCOSE BLDC GLUCOMTR-MCNC: 133 MG/DL — HIGH (ref 70–99)
GLUCOSE BLDC GLUCOMTR-MCNC: 143 MG/DL — HIGH (ref 70–99)
MRSA PCR RESULT.: SIGNIFICANT CHANGE UP
S AUREUS DNA NOSE QL NAA+PROBE: DETECTED

## 2025-06-04 PROCEDURE — 99232 SBSQ HOSP IP/OBS MODERATE 35: CPT

## 2025-06-04 PROCEDURE — 99223 1ST HOSP IP/OBS HIGH 75: CPT

## 2025-06-04 RX ORDER — PIPERACILLIN-TAZO-DEXTROSE,ISO 3.375G/5
3.38 IV SOLUTION, PIGGYBACK PREMIX FROZEN(ML) INTRAVENOUS ONCE
Refills: 0 | Status: DISCONTINUED | OUTPATIENT
Start: 2025-06-04 | End: 2025-06-04

## 2025-06-04 RX ORDER — MAGNESIUM, ALUMINUM HYDROXIDE 200-200 MG
30 TABLET,CHEWABLE ORAL EVERY 4 HOURS
Refills: 0 | Status: DISCONTINUED | OUTPATIENT
Start: 2025-06-04 | End: 2025-06-11

## 2025-06-04 RX ORDER — PIPERACILLIN-TAZO-DEXTROSE,ISO 3.375G/5
3.38 IV SOLUTION, PIGGYBACK PREMIX FROZEN(ML) INTRAVENOUS EVERY 8 HOURS
Refills: 0 | Status: DISCONTINUED | OUTPATIENT
Start: 2025-06-04 | End: 2025-06-04

## 2025-06-04 RX ORDER — METOPROLOL SUCCINATE 50 MG/1
50 TABLET, EXTENDED RELEASE ORAL DAILY
Refills: 0 | Status: DISCONTINUED | OUTPATIENT
Start: 2025-06-04 | End: 2025-06-11

## 2025-06-04 RX ORDER — DEXTROSE 50 % IN WATER 50 %
15 SYRINGE (ML) INTRAVENOUS ONCE
Refills: 0 | Status: DISCONTINUED | OUTPATIENT
Start: 2025-06-04 | End: 2025-06-11

## 2025-06-04 RX ORDER — INSULIN LISPRO 100 U/ML
INJECTION, SOLUTION INTRAVENOUS; SUBCUTANEOUS
Refills: 0 | Status: DISCONTINUED | OUTPATIENT
Start: 2025-06-04 | End: 2025-06-11

## 2025-06-04 RX ORDER — GLUCAGON 3 MG/1
1 POWDER NASAL ONCE
Refills: 0 | Status: DISCONTINUED | OUTPATIENT
Start: 2025-06-04 | End: 2025-06-11

## 2025-06-04 RX ORDER — ACETAMINOPHEN 500 MG/5ML
975 LIQUID (ML) ORAL EVERY 8 HOURS
Refills: 0 | Status: DISCONTINUED | OUTPATIENT
Start: 2025-06-04 | End: 2025-06-11

## 2025-06-04 RX ORDER — OXYCODONE HYDROCHLORIDE 30 MG/1
2.5 TABLET ORAL EVERY 4 HOURS
Refills: 0 | Status: DISCONTINUED | OUTPATIENT
Start: 2025-06-04 | End: 2025-06-04

## 2025-06-04 RX ORDER — SODIUM CHLORIDE 9 G/1000ML
1000 INJECTION, SOLUTION INTRAVENOUS
Refills: 0 | Status: DISCONTINUED | OUTPATIENT
Start: 2025-06-04 | End: 2025-06-11

## 2025-06-04 RX ORDER — PIPERACILLIN-TAZO-DEXTROSE,ISO 3.375G/5
3.38 IV SOLUTION, PIGGYBACK PREMIX FROZEN(ML) INTRAVENOUS EVERY 8 HOURS
Refills: 0 | Status: DISCONTINUED | OUTPATIENT
Start: 2025-06-04 | End: 2025-06-08

## 2025-06-04 RX ORDER — CLOPIDOGREL BISULFATE 75 MG/1
75 TABLET, FILM COATED ORAL DAILY
Refills: 0 | Status: DISCONTINUED | OUTPATIENT
Start: 2025-06-04 | End: 2025-06-11

## 2025-06-04 RX ORDER — OXYCODONE HYDROCHLORIDE 30 MG/1
5 TABLET ORAL EVERY 4 HOURS
Refills: 0 | Status: DISCONTINUED | OUTPATIENT
Start: 2025-06-04 | End: 2025-06-07

## 2025-06-04 RX ORDER — KETOROLAC TROMETHAMINE 30 MG/ML
15 INJECTION, SOLUTION INTRAMUSCULAR; INTRAVENOUS ONCE
Refills: 0 | Status: DISCONTINUED | OUTPATIENT
Start: 2025-06-04 | End: 2025-06-04

## 2025-06-04 RX ORDER — SENNA 187 MG
2 TABLET ORAL AT BEDTIME
Refills: 0 | Status: DISCONTINUED | OUTPATIENT
Start: 2025-06-04 | End: 2025-06-11

## 2025-06-04 RX ORDER — MELATONIN 5 MG
3 TABLET ORAL AT BEDTIME
Refills: 0 | Status: DISCONTINUED | OUTPATIENT
Start: 2025-06-04 | End: 2025-06-11

## 2025-06-04 RX ORDER — CLINDAMYCIN PHOSPHATE 150 MG/ML
600 VIAL (ML) INJECTION ONCE
Refills: 0 | Status: DISCONTINUED | OUTPATIENT
Start: 2025-06-04 | End: 2025-06-04

## 2025-06-04 RX ORDER — ATORVASTATIN CALCIUM 80 MG/1
80 TABLET, FILM COATED ORAL AT BEDTIME
Refills: 0 | Status: DISCONTINUED | OUTPATIENT
Start: 2025-06-04 | End: 2025-06-11

## 2025-06-04 RX ORDER — PIPERACILLIN-TAZO-DEXTROSE,ISO 3.375G/5
3.38 IV SOLUTION, PIGGYBACK PREMIX FROZEN(ML) INTRAVENOUS ONCE
Refills: 0 | Status: COMPLETED | OUTPATIENT
Start: 2025-06-04 | End: 2025-06-04

## 2025-06-04 RX ORDER — BISACODYL 5 MG
5 TABLET, DELAYED RELEASE (ENTERIC COATED) ORAL DAILY
Refills: 0 | Status: DISCONTINUED | OUTPATIENT
Start: 2025-06-04 | End: 2025-06-11

## 2025-06-04 RX ORDER — POLYETHYLENE GLYCOL 3350 17 G/17G
17 POWDER, FOR SOLUTION ORAL DAILY
Refills: 0 | Status: DISCONTINUED | OUTPATIENT
Start: 2025-06-04 | End: 2025-06-11

## 2025-06-04 RX ORDER — DEXTROSE 50 % IN WATER 50 %
25 SYRINGE (ML) INTRAVENOUS ONCE
Refills: 0 | Status: DISCONTINUED | OUTPATIENT
Start: 2025-06-04 | End: 2025-06-11

## 2025-06-04 RX ORDER — OXYCODONE HYDROCHLORIDE 30 MG/1
10 TABLET ORAL EVERY 4 HOURS
Refills: 0 | Status: DISCONTINUED | OUTPATIENT
Start: 2025-06-04 | End: 2025-06-04

## 2025-06-04 RX ORDER — ACETAMINOPHEN 500 MG/5ML
1000 LIQUID (ML) ORAL EVERY 8 HOURS
Refills: 0 | Status: DISCONTINUED | OUTPATIENT
Start: 2025-06-04 | End: 2025-06-04

## 2025-06-04 RX ORDER — VANCOMYCIN HCL IN 5 % DEXTROSE 1.5G/250ML
1000 PLASTIC BAG, INJECTION (ML) INTRAVENOUS EVERY 24 HOURS
Refills: 0 | Status: DISCONTINUED | OUTPATIENT
Start: 2025-06-04 | End: 2025-06-06

## 2025-06-04 RX ORDER — VANCOMYCIN HCL IN 5 % DEXTROSE 1.5G/250ML
1250 PLASTIC BAG, INJECTION (ML) INTRAVENOUS EVERY 12 HOURS
Refills: 0 | Status: DISCONTINUED | OUTPATIENT
Start: 2025-06-04 | End: 2025-06-04

## 2025-06-04 RX ORDER — ONDANSETRON HCL/PF 4 MG/2 ML
4 VIAL (ML) INJECTION EVERY 8 HOURS
Refills: 0 | Status: DISCONTINUED | OUTPATIENT
Start: 2025-06-04 | End: 2025-06-11

## 2025-06-04 RX ORDER — NALOXONE HYDROCHLORIDE 0.4 MG/ML
0.4 INJECTION, SOLUTION INTRAMUSCULAR; INTRAVENOUS; SUBCUTANEOUS ONCE
Refills: 0 | Status: DISCONTINUED | OUTPATIENT
Start: 2025-06-04 | End: 2025-06-11

## 2025-06-04 RX ORDER — EZETIMIBE 10 MG/1
10 TABLET ORAL DAILY
Refills: 0 | Status: DISCONTINUED | OUTPATIENT
Start: 2025-06-04 | End: 2025-06-11

## 2025-06-04 RX ORDER — OXYCODONE HYDROCHLORIDE 30 MG/1
5 TABLET ORAL EVERY 4 HOURS
Refills: 0 | Status: DISCONTINUED | OUTPATIENT
Start: 2025-06-04 | End: 2025-06-04

## 2025-06-04 RX ADMIN — Medication 2 TABLET(S): at 21:35

## 2025-06-04 RX ADMIN — Medication 250 MILLIGRAM(S): at 13:20

## 2025-06-04 RX ADMIN — OXYCODONE HYDROCHLORIDE 5 MILLIGRAM(S): 30 TABLET ORAL at 18:30

## 2025-06-04 RX ADMIN — Medication 3 MILLIGRAM(S): at 21:35

## 2025-06-04 RX ADMIN — Medication 25 GRAM(S): at 17:32

## 2025-06-04 RX ADMIN — CLOPIDOGREL BISULFATE 75 MILLIGRAM(S): 75 TABLET, FILM COATED ORAL at 11:46

## 2025-06-04 RX ADMIN — OXYCODONE HYDROCHLORIDE 5 MILLIGRAM(S): 30 TABLET ORAL at 17:38

## 2025-06-04 RX ADMIN — Medication 200 GRAM(S): at 06:27

## 2025-06-04 RX ADMIN — Medication 1000 MILLIGRAM(S): at 13:20

## 2025-06-04 RX ADMIN — Medication 25 GRAM(S): at 08:11

## 2025-06-04 RX ADMIN — Medication 1000 MILLIGRAM(S): at 06:27

## 2025-06-04 RX ADMIN — Medication 1000 MILLIGRAM(S): at 14:20

## 2025-06-04 RX ADMIN — KETOROLAC TROMETHAMINE 15 MILLIGRAM(S): 30 INJECTION, SOLUTION INTRAMUSCULAR; INTRAVENOUS at 04:15

## 2025-06-04 RX ADMIN — EZETIMIBE 10 MILLIGRAM(S): 10 TABLET ORAL at 11:46

## 2025-06-04 RX ADMIN — ATORVASTATIN CALCIUM 80 MILLIGRAM(S): 80 TABLET, FILM COATED ORAL at 21:35

## 2025-06-04 RX ADMIN — METOPROLOL SUCCINATE 50 MILLIGRAM(S): 50 TABLET, EXTENDED RELEASE ORAL at 06:28

## 2025-06-04 NOTE — H&P ADULT - ASSESSMENT
75-year-old female with past medical history of hypertension, hyperlipidemia, diabetes, CAD with stent placement, presents today with lower extremity cellulitis that failed outpatient oral antibiotic therapy, admitted for IV antibiotic therapy    #Cellulitis lower extremities    - Continue zosyn & vancomycin  - MRSA ordered  - Blood cultures ordered    #DM  - ISS  - POCT glucose checks    Chronic medical conditions:   HTN:  HLD:  CAD s/p stent:     DVT PPx: Low risk for VTE  Code:   Medication list 75-year-old female with past medical history of hypertension, hyperlipidemia, diabetes, CAD with stent placement, presents today with lower extremity cellulitis that failed outpatient oral antibiotic therapy, admitted for IV antibiotic therapy    #Cellulitis lower extremities  Patient presents with left lower extremity cellulitis that has been worsening despite outpatient antibiotic therapy. Patient cannot recall name of antibiotic provided but as per chart review, patient was prescribed a course of keflex on 4/2. Physical exam reveals erythema of left lower extremity with yellowing and crusting of the periphery of the region   - Continue zosyn & vancomycin  - MRSA ordered  - Blood cultures ordered    #DM  - ISS  - POCT glucose checks    Chronic medical conditions:   HTN: Continue metoprolol  HLD: Continue atorvastatin and ezetimibe  CAD s/p stent: Continue plavix    DVT PPx: Low risk for VTE  Code: Full  Medication list acquired from patient

## 2025-06-04 NOTE — H&P ADULT - HISTORY OF PRESENT ILLNESS
75-year-old female with past medical history of hypertension, hyperlipidemia, diabetes, CAD with stent placement, presents today with lower extremity cellulitis that failed outpatient oral antibiotic therapy      ED course: Patient afebrile (although T 99.3) and hemodynamically stable. Labs remarkable for WBC 11.00. Duplex US b/l LE shows no evidence of deep venous thrombosis in either lower extremity. 75-year-old female with past medical history of hypertension, hyperlipidemia, diabetes, CAD with stent placement, presents today with lower extremity cellulitis that failed outpatient oral antibiotic therapy. Patient states that about 1-2 months ago, she had a cut on her left lower extremity that soon became infected and the area became erythematous. Patient had visited an outpatient facility twice for antibiotic therapy but the infection failed to respond to both of these antibiotics. Patient cannot recall name of antibiotics prescribed but as per chart review, patient was prescribed Keflex on 4/2/2025. Patient states she completed her antibiotic course. Patient states that area has been draining watery pus. She also notes area has been erythematous. Patient denies fever, chills, nausea, vomiting, and diarrhea.       ED course: Patient afebrile (although T 99.3) and hemodynamically stable. Labs remarkable for WBC 11.00. Duplex US b/l LE shows no evidence of deep venous thrombosis in either lower extremity.

## 2025-06-04 NOTE — H&P ADULT - NSHPPHYSICALEXAM_GEN_ALL_CORE
Vital Signs Last 24 Hrs  T(C): 37 (03 Jun 2025 23:40), Max: 37.4 (03 Jun 2025 20:49)  T(F): 98.6 (03 Jun 2025 23:40), Max: 99.3 (03 Jun 2025 20:49)  HR: 79 (03 Jun 2025 23:40) (77 - 105)  BP: 147/86 (03 Jun 2025 23:40) (147/86 - 169/84)  BP(mean): 107 (03 Jun 2025 23:40) (107 - 107)  RR: 18 (03 Jun 2025 23:40) (18 - 18)  SpO2: 97% (03 Jun 2025 23:40) (96% - 98%)    Parameters below as of 03 Jun 2025 23:40  Patient On (Oxygen Delivery Method): room air Vital Signs Last 24 Hrs  T(C): 37 (03 Jun 2025 23:40), Max: 37.4 (03 Jun 2025 20:49)  T(F): 98.6 (03 Jun 2025 23:40), Max: 99.3 (03 Jun 2025 20:49)  HR: 79 (03 Jun 2025 23:40) (77 - 105)  BP: 147/86 (03 Jun 2025 23:40) (147/86 - 169/84)  BP(mean): 107 (03 Jun 2025 23:40) (107 - 107)  RR: 18 (03 Jun 2025 23:40) (18 - 18)  SpO2: 97% (03 Jun 2025 23:40) (96% - 98%)    Parameters below as of 03 Jun 2025 23:40  Patient On (Oxygen Delivery Method): room air    GENERAL: NAD, lying in bed comfortably  HEAD:  Atraumatic, normocephalic  EYES: EOMI, PERRL  NECK: Supple, trachea midline, no JVD  HEART: Regular rate and rhythm  LUNGS: Unlabored respirations.  Clear to auscultation bilaterally, no crackles, wheezing, or rhonchi  ABDOMEN: Soft, nontender, nondistended, +BS  EXTREMITIES: 2+ peripheral pulses bilaterally. No clubbing, cyanosis, or edema. Erythema of left lower extremity with yellowing and crusting of the periphery of the region, draining clear pus  NERVOUS SYSTEM:  A&Ox3, moving all extremities, no focal deficits

## 2025-06-04 NOTE — PATIENT PROFILE ADULT - FALL HARM RISK - RISK INTERVENTIONS

## 2025-06-04 NOTE — H&P ADULT - NSHPLABSRESULTS_GEN_ALL_CORE
.  LABS:                         11.7   11.00 )-----------( 296      ( 03 Jun 2025 21:45 )             36.1     06-03    140  |  107  |  26[H]  ----------------------------<  110[H]  3.8   |  27  |  0.82    Ca    9.4      03 Jun 2025 21:45    TPro  7.7  /  Alb  3.5  /  TBili  0.6  /  DBili  x   /  AST  13[L]  /  ALT  19  /  AlkPhos  86  06-03      Urinalysis Basic - ( 03 Jun 2025 21:45 )    Color: x / Appearance: x / SG: x / pH: x  Gluc: 110 mg/dL / Ketone: x  / Bili: x / Urobili: x   Blood: x / Protein: x / Nitrite: x   Leuk Esterase: x / RBC: x / WBC x   Sq Epi: x / Non Sq Epi: x / Bacteria: x            RADIOLOGY, EKG & ADDITIONAL TESTS: Reviewed.

## 2025-06-04 NOTE — PROGRESS NOTE ADULT - SUBJECTIVE AND OBJECTIVE BOX
Patient: JOE FISH [id number] 75y Female                            Hospitalist Attending Note    No complaints.   No fever / chills    ____________________PHYSICAL EXAM:  GENERAL:  NAD Alert and Oriented x 3   HEENT: NCAT  CARDIOVASCULAR:  S1, S2  LUNGS: CTAB  ABDOMEN:  soft, (-) tenderness, (-) distension, (+) bowel sounds, (-) guarding, (-) rebound (-) rigidity  EXTREMITIES:  no cyanosis / clubbing / edema.  LLE dressing in place.    ____________________     VITALS:  Vital Signs Last 24 Hrs  T(C): 36.8 (04 Jun 2025 16:45), Max: 37.4 (03 Jun 2025 20:49)  T(F): 98.2 (04 Jun 2025 16:45), Max: 99.3 (03 Jun 2025 20:49)  HR: 72 (04 Jun 2025 16:45) (68 - 105)  BP: 145/80 (04 Jun 2025 16:45) (145/80 - 169/84)  BP(mean): 103 (04 Jun 2025 03:20) (103 - 107)  RR: 18 (04 Jun 2025 16:45) (16 - 18)  SpO2: 97% (04 Jun 2025 16:45) (96% - 99%)    Parameters below as of 04 Jun 2025 16:45  Patient On (Oxygen Delivery Method): room air     Daily Height in cm: 157.48 (03 Jun 2025 20:02)    Daily   CAPILLARY BLOOD GLUCOSE      POCT Blood Glucose.: 128 mg/dL (04 Jun 2025 16:20)  POCT Blood Glucose.: 143 mg/dL (04 Jun 2025 11:27)  POCT Blood Glucose.: 113 mg/dL (04 Jun 2025 07:53)    I&O's Summary      HISTORY:  PAST MEDICAL & SURGICAL HISTORY:  CAD (coronary artery disease)      HTN (hypertension)      Kidney stones      Diabetes      Stented coronary artery      Stented coronary artery      Allergies    No Known Allergies    Intolerances       LABS:                        11.7   11.00 )-----------( 296      ( 03 Jun 2025 21:45 )             36.1     06-03    140  |  107  |  26[H]  ----------------------------<  110[H]  3.8   |  27  |  0.82    Ca    9.4      03 Jun 2025 21:45    TPro  7.7  /  Alb  3.5  /  TBili  0.6  /  DBili  x   /  AST  13[L]  /  ALT  19  /  AlkPhos  86  06-03      LIVER FUNCTIONS - ( 03 Jun 2025 21:45 )  Alb: 3.5 g/dL / Pro: 7.7 gm/dL / ALK PHOS: 86 U/L / ALT: 19 U/L / AST: 13 U/L / GGT: x           Urinalysis Basic - ( 03 Jun 2025 21:45 )    Color: x / Appearance: x / SG: x / pH: x  Gluc: 110 mg/dL / Ketone: x  / Bili: x / Urobili: x   Blood: x / Protein: x / Nitrite: x   Leuk Esterase: x / RBC: x / WBC x   Sq Epi: x / Non Sq Epi: x / Bacteria: x              MEDICATIONS:  MEDICATIONS  (STANDING):  atorvastatin 80 milliGRAM(s) Oral at bedtime  clopidogrel Tablet 75 milliGRAM(s) Oral daily  dextrose 5%. 1000 milliLiter(s) (50 mL/Hr) IV Continuous <Continuous>  dextrose 50% Injectable 25 Gram(s) IV Push once  ezetimibe 10 milliGRAM(s) Oral daily  glucagon  Injectable 1 milliGRAM(s) IntraMuscular once  insulin lispro (ADMELOG) corrective regimen sliding scale   SubCutaneous three times a day before meals  metoprolol succinate ER 50 milliGRAM(s) Oral daily  naloxone Injectable 0.4 milliGRAM(s) IV Push once  piperacillin/tazobactam IVPB.. 3.375 Gram(s) IV Intermittent every 8 hours  polyethylene glycol 3350 17 Gram(s) Oral daily  senna 2 Tablet(s) Oral at bedtime  vancomycin  IVPB 1000 milliGRAM(s) IV Intermittent every 24 hours    MEDICATIONS  (PRN):  acetaminophen     Tablet .. 975 milliGRAM(s) Oral every 8 hours PRN Mild Pain (1 - 3)  aluminum hydroxide/magnesium hydroxide/simethicone Suspension 30 milliLiter(s) Oral every 4 hours PRN Dyspepsia  bisacodyl 5 milliGRAM(s) Oral daily PRN Constipation  dextrose Oral Gel 15 Gram(s) Oral once PRN Blood Glucose LESS THAN 70 milliGRAM(s)/deciliter  melatonin 3 milliGRAM(s) Oral at bedtime PRN Insomnia  ondansetron Injectable 4 milliGRAM(s) IV Push every 8 hours PRN Nausea and/or Vomiting  oxyCODONE    IR 2.5 milliGRAM(s) Oral every 4 hours PRN Moderate Pain (4 - 6)  oxyCODONE    IR 5 milliGRAM(s) Oral every 4 hours PRN Severe Pain (7 - 10)

## 2025-06-04 NOTE — PROGRESS NOTE ADULT - ASSESSMENT
75-year-old female with past medical history of hypertension, hyperlipidemia, diabetes, CAD with stent placement, presents today with lower extremity cellulitis that failed outpatient oral antibiotic therapy, admitted for IV antibiotic therapy    #Cellulitis lower extremities  Patient presents with left lower extremity cellulitis that has been worsening despite outpatient antibiotic therapy. Patient cannot recall name of antibiotic provided but as per chart review, patient was prescribed a course of keflex on 4/2. Physical exam reveals erythema of left lower extremity with yellowing and crusting of the periphery of the region   - Continue zosyn & vancomycin  - MRSA ordered  - Blood cultures ordered    # Diabetes Type II - monitor fingersticks.  Insulin coverage for hyperglycemia.   # HTN: Continue metoprolol  # HLD: Continue atorvastatin and ezetimibe  # CAD s/p stent: Continue plavix    DVT PPx: Low risk for VTE  Code: Full  Medication list acquired from patient

## 2025-06-05 LAB
A1C WITH ESTIMATED AVERAGE GLUCOSE RESULT: 7 % — HIGH (ref 4–5.6)
ALBUMIN SERPL ELPH-MCNC: 2.6 G/DL — LOW (ref 3.3–5)
ALP SERPL-CCNC: 78 U/L — SIGNIFICANT CHANGE UP (ref 40–120)
ALT FLD-CCNC: 13 U/L — SIGNIFICANT CHANGE UP (ref 12–78)
ANION GAP SERPL CALC-SCNC: 4 MMOL/L — LOW (ref 5–17)
AST SERPL-CCNC: 16 U/L — SIGNIFICANT CHANGE UP (ref 15–37)
BASOPHILS # BLD AUTO: 0.04 K/UL — SIGNIFICANT CHANGE UP (ref 0–0.2)
BASOPHILS NFR BLD AUTO: 0.5 % — SIGNIFICANT CHANGE UP (ref 0–2)
BILIRUB SERPL-MCNC: 0.7 MG/DL — SIGNIFICANT CHANGE UP (ref 0.2–1.2)
BUN SERPL-MCNC: 22 MG/DL — SIGNIFICANT CHANGE UP (ref 7–23)
CALCIUM SERPL-MCNC: 8.3 MG/DL — LOW (ref 8.5–10.1)
CHLORIDE SERPL-SCNC: 106 MMOL/L — SIGNIFICANT CHANGE UP (ref 96–108)
CO2 SERPL-SCNC: 27 MMOL/L — SIGNIFICANT CHANGE UP (ref 22–31)
CREAT SERPL-MCNC: 0.75 MG/DL — SIGNIFICANT CHANGE UP (ref 0.5–1.3)
EGFR: 84 ML/MIN/1.73M2 — SIGNIFICANT CHANGE UP
EGFR: 84 ML/MIN/1.73M2 — SIGNIFICANT CHANGE UP
EOSINOPHIL # BLD AUTO: 0.21 K/UL — SIGNIFICANT CHANGE UP (ref 0–0.5)
EOSINOPHIL NFR BLD AUTO: 2.6 % — SIGNIFICANT CHANGE UP (ref 0–6)
ESTIMATED AVERAGE GLUCOSE: 154 MG/DL — HIGH (ref 68–114)
GLUCOSE BLDC GLUCOMTR-MCNC: 116 MG/DL — HIGH (ref 70–99)
GLUCOSE BLDC GLUCOMTR-MCNC: 143 MG/DL — HIGH (ref 70–99)
GLUCOSE BLDC GLUCOMTR-MCNC: 145 MG/DL — HIGH (ref 70–99)
GLUCOSE BLDC GLUCOMTR-MCNC: 217 MG/DL — HIGH (ref 70–99)
GLUCOSE SERPL-MCNC: 106 MG/DL — HIGH (ref 70–99)
HCT VFR BLD CALC: 32.8 % — LOW (ref 34.5–45)
HGB BLD-MCNC: 10.4 G/DL — LOW (ref 11.5–15.5)
IMM GRANULOCYTES NFR BLD AUTO: 0.2 % — SIGNIFICANT CHANGE UP (ref 0–0.9)
LYMPHOCYTES # BLD AUTO: 1.83 K/UL — SIGNIFICANT CHANGE UP (ref 1–3.3)
LYMPHOCYTES # BLD AUTO: 22.5 % — SIGNIFICANT CHANGE UP (ref 13–44)
MCHC RBC-ENTMCNC: 25.2 PG — LOW (ref 27–34)
MCHC RBC-ENTMCNC: 31.7 G/DL — LOW (ref 32–36)
MCV RBC AUTO: 79.6 FL — LOW (ref 80–100)
MONOCYTES # BLD AUTO: 1.03 K/UL — HIGH (ref 0–0.9)
MONOCYTES NFR BLD AUTO: 12.7 % — SIGNIFICANT CHANGE UP (ref 2–14)
NEUTROPHILS # BLD AUTO: 4.99 K/UL — SIGNIFICANT CHANGE UP (ref 1.8–7.4)
NEUTROPHILS NFR BLD AUTO: 61.5 % — SIGNIFICANT CHANGE UP (ref 43–77)
NRBC BLD AUTO-RTO: 0 /100 WBCS — SIGNIFICANT CHANGE UP (ref 0–0)
PLATELET # BLD AUTO: 238 K/UL — SIGNIFICANT CHANGE UP (ref 150–400)
POTASSIUM SERPL-MCNC: 3.9 MMOL/L — SIGNIFICANT CHANGE UP (ref 3.5–5.3)
POTASSIUM SERPL-SCNC: 3.9 MMOL/L — SIGNIFICANT CHANGE UP (ref 3.5–5.3)
PROT SERPL-MCNC: 6.5 GM/DL — SIGNIFICANT CHANGE UP (ref 6–8.3)
RBC # BLD: 4.12 M/UL — SIGNIFICANT CHANGE UP (ref 3.8–5.2)
RBC # FLD: 13.3 % — SIGNIFICANT CHANGE UP (ref 10.3–14.5)
SODIUM SERPL-SCNC: 137 MMOL/L — SIGNIFICANT CHANGE UP (ref 135–145)
WBC # BLD: 8.12 K/UL — SIGNIFICANT CHANGE UP (ref 3.8–10.5)
WBC # FLD AUTO: 8.12 K/UL — SIGNIFICANT CHANGE UP (ref 3.8–10.5)

## 2025-06-05 PROCEDURE — 99232 SBSQ HOSP IP/OBS MODERATE 35: CPT

## 2025-06-05 RX ADMIN — Medication 3 MILLIGRAM(S): at 21:13

## 2025-06-05 RX ADMIN — Medication 25 GRAM(S): at 23:57

## 2025-06-05 RX ADMIN — Medication 25 GRAM(S): at 00:27

## 2025-06-05 RX ADMIN — Medication 25 GRAM(S): at 08:27

## 2025-06-05 RX ADMIN — EZETIMIBE 10 MILLIGRAM(S): 10 TABLET ORAL at 11:52

## 2025-06-05 RX ADMIN — OXYCODONE HYDROCHLORIDE 5 MILLIGRAM(S): 30 TABLET ORAL at 02:29

## 2025-06-05 RX ADMIN — OXYCODONE HYDROCHLORIDE 5 MILLIGRAM(S): 30 TABLET ORAL at 16:58

## 2025-06-05 RX ADMIN — METOPROLOL SUCCINATE 50 MILLIGRAM(S): 50 TABLET, EXTENDED RELEASE ORAL at 05:33

## 2025-06-05 RX ADMIN — OXYCODONE HYDROCHLORIDE 5 MILLIGRAM(S): 30 TABLET ORAL at 15:58

## 2025-06-05 RX ADMIN — Medication 25 GRAM(S): at 15:58

## 2025-06-05 RX ADMIN — OXYCODONE HYDROCHLORIDE 5 MILLIGRAM(S): 30 TABLET ORAL at 23:57

## 2025-06-05 RX ADMIN — Medication 2 TABLET(S): at 21:13

## 2025-06-05 RX ADMIN — Medication 250 MILLIGRAM(S): at 13:13

## 2025-06-05 RX ADMIN — CLOPIDOGREL BISULFATE 75 MILLIGRAM(S): 75 TABLET, FILM COATED ORAL at 11:52

## 2025-06-05 RX ADMIN — ATORVASTATIN CALCIUM 80 MILLIGRAM(S): 80 TABLET, FILM COATED ORAL at 21:13

## 2025-06-05 RX ADMIN — OXYCODONE HYDROCHLORIDE 5 MILLIGRAM(S): 30 TABLET ORAL at 03:29

## 2025-06-05 NOTE — PROGRESS NOTE ADULT - SUBJECTIVE AND OBJECTIVE BOX
Patient: JOE FISH [id number] 75y Female                            Hospitalist Attending Note    No complaints.   No fever / chills    ____________________PHYSICAL EXAM:  GENERAL:  NAD Alert and Oriented x 3   HEENT: NCAT  CARDIOVASCULAR:  S1, S2  LUNGS: CTAB  ABDOMEN:  soft, (-) tenderness, (-) distension, (+) bowel sounds, (-) guarding, (-) rebound (-) rigidity  EXTREMITIES:  no cyanosis / clubbing / edema.  LLE open wounds with clean base, minimal surrounding erythema.   ____________________      VITALS:  Vital Signs Last 24 Hrs  T(C): 36.8 (05 Jun 2025 16:42), Max: 36.9 (04 Jun 2025 23:55)  T(F): 98.2 (05 Jun 2025 16:42), Max: 98.5 (04 Jun 2025 23:55)  HR: 73 (05 Jun 2025 16:42) (73 - 78)  BP: 114/61 (05 Jun 2025 16:42) (114/61 - 145/56)  BP(mean): --  RR: 17 (05 Jun 2025 16:42) (17 - 18)  SpO2: 94% (05 Jun 2025 16:42) (94% - 97%)    Parameters below as of 05 Jun 2025 16:42  Patient On (Oxygen Delivery Method): room air     Daily     Daily   CAPILLARY BLOOD GLUCOSE      POCT Blood Glucose.: 143 mg/dL (05 Jun 2025 16:04)  POCT Blood Glucose.: 217 mg/dL (05 Jun 2025 11:43)  POCT Blood Glucose.: 116 mg/dL (05 Jun 2025 07:48)  POCT Blood Glucose.: 133 mg/dL (04 Jun 2025 21:18)    I&O's Summary    04 Jun 2025 07:01  -  05 Jun 2025 07:00  --------------------------------------------------------  IN: 694 mL / OUT: 0 mL / NET: 694 mL    05 Jun 2025 07:01  -  05 Jun 2025 17:55  --------------------------------------------------------  IN: 240 mL / OUT: 0 mL / NET: 240 mL        LABS:                        10.4   8.12  )-----------( 238      ( 05 Jun 2025 07:52 )             32.8     06-05    137  |  106  |  22  ----------------------------<  106[H]  3.9   |  27  |  0.75    Ca    8.3[L]      05 Jun 2025 07:52    TPro  6.5  /  Alb  2.6[L]  /  TBili  0.7  /  DBili  x   /  AST  16  /  ALT  13  /  AlkPhos  78  06-05      LIVER FUNCTIONS - ( 05 Jun 2025 07:52 )  Alb: 2.6 g/dL / Pro: 6.5 gm/dL / ALK PHOS: 78 U/L / ALT: 13 U/L / AST: 16 U/L / GGT: x           Urinalysis Basic - ( 05 Jun 2025 07:52 )    Color: x / Appearance: x / SG: x / pH: x  Gluc: 106 mg/dL / Ketone: x  / Bili: x / Urobili: x   Blood: x / Protein: x / Nitrite: x   Leuk Esterase: x / RBC: x / WBC x   Sq Epi: x / Non Sq Epi: x / Bacteria: x              MEDICATIONS:  acetaminophen     Tablet .. 975 milliGRAM(s) Oral every 8 hours PRN  aluminum hydroxide/magnesium hydroxide/simethicone Suspension 30 milliLiter(s) Oral every 4 hours PRN  atorvastatin 80 milliGRAM(s) Oral at bedtime  bisacodyl 5 milliGRAM(s) Oral daily PRN  clopidogrel Tablet 75 milliGRAM(s) Oral daily  dextrose 5%. 1000 milliLiter(s) IV Continuous <Continuous>  dextrose 50% Injectable 25 Gram(s) IV Push once  dextrose Oral Gel 15 Gram(s) Oral once PRN  ezetimibe 10 milliGRAM(s) Oral daily  glucagon  Injectable 1 milliGRAM(s) IntraMuscular once  insulin lispro (ADMELOG) corrective regimen sliding scale   SubCutaneous three times a day before meals  melatonin 3 milliGRAM(s) Oral at bedtime PRN  metoprolol succinate ER 50 milliGRAM(s) Oral daily  naloxone Injectable 0.4 milliGRAM(s) IV Push once  ondansetron Injectable 4 milliGRAM(s) IV Push every 8 hours PRN  oxyCODONE    IR 2.5 milliGRAM(s) Oral every 4 hours PRN  oxyCODONE    IR 5 milliGRAM(s) Oral every 4 hours PRN  piperacillin/tazobactam IVPB.. 3.375 Gram(s) IV Intermittent every 8 hours  polyethylene glycol 3350 17 Gram(s) Oral daily  senna 2 Tablet(s) Oral at bedtime  vancomycin  IVPB 1000 milliGRAM(s) IV Intermittent every 24 hours

## 2025-06-05 NOTE — PROGRESS NOTE ADULT - ASSESSMENT
75-year-old female with past medical history of hypertension, hyperlipidemia, diabetes, CAD with stent placement, presents today with lower extremity cellulitis that failed outpatient oral antibiotic therapy, admitted for IV antibiotic therapy    #Cellulitis lower extremities  Patient presents with left lower extremity cellulitis that has been worsening despite outpatient antibiotic therapy. Patient cannot recall name of antibiotic provided but as per chart review, patient was prescribed a course of keflex on 4/2. Physical exam reveals erythema of left lower extremity with yellowing and crusting of the periphery of the region   - Continue zosyn & vancomycin  - MRSA ordered  - Blood cultures ordered  - Wound care evaluation.     # Diabetes Type II - monitor fingersticks.  Insulin coverage for hyperglycemia.   # HTN: Continue metoprolol  # HLD: Continue atorvastatin and ezetimibe  # CAD s/p stent: Continue plavix    DVT PPx: Low risk for VTE  Code: Full  Medication list acquired from patient

## 2025-06-06 LAB
ANION GAP SERPL CALC-SCNC: 7 MMOL/L — SIGNIFICANT CHANGE UP (ref 5–17)
BUN SERPL-MCNC: 14 MG/DL — SIGNIFICANT CHANGE UP (ref 7–23)
CALCIUM SERPL-MCNC: 8.5 MG/DL — SIGNIFICANT CHANGE UP (ref 8.5–10.1)
CHLORIDE SERPL-SCNC: 108 MMOL/L — SIGNIFICANT CHANGE UP (ref 96–108)
CO2 SERPL-SCNC: 24 MMOL/L — SIGNIFICANT CHANGE UP (ref 22–31)
CREAT SERPL-MCNC: 0.75 MG/DL — SIGNIFICANT CHANGE UP (ref 0.5–1.3)
EGFR: 84 ML/MIN/1.73M2 — SIGNIFICANT CHANGE UP
EGFR: 84 ML/MIN/1.73M2 — SIGNIFICANT CHANGE UP
GLUCOSE BLDC GLUCOMTR-MCNC: 108 MG/DL — HIGH (ref 70–99)
GLUCOSE BLDC GLUCOMTR-MCNC: 128 MG/DL — HIGH (ref 70–99)
GLUCOSE BLDC GLUCOMTR-MCNC: 184 MG/DL — HIGH (ref 70–99)
GLUCOSE BLDC GLUCOMTR-MCNC: 246 MG/DL — HIGH (ref 70–99)
GLUCOSE SERPL-MCNC: 108 MG/DL — HIGH (ref 70–99)
HCT VFR BLD CALC: 35 % — SIGNIFICANT CHANGE UP (ref 34.5–45)
HGB BLD-MCNC: 10.8 G/DL — LOW (ref 11.5–15.5)
MCHC RBC-ENTMCNC: 24.8 PG — LOW (ref 27–34)
MCHC RBC-ENTMCNC: 30.9 G/DL — LOW (ref 32–36)
MCV RBC AUTO: 80.5 FL — SIGNIFICANT CHANGE UP (ref 80–100)
NRBC BLD AUTO-RTO: 0 /100 WBCS — SIGNIFICANT CHANGE UP (ref 0–0)
PLATELET # BLD AUTO: 244 K/UL — SIGNIFICANT CHANGE UP (ref 150–400)
POTASSIUM SERPL-MCNC: 4.1 MMOL/L — SIGNIFICANT CHANGE UP (ref 3.5–5.3)
POTASSIUM SERPL-SCNC: 4.1 MMOL/L — SIGNIFICANT CHANGE UP (ref 3.5–5.3)
RBC # BLD: 4.35 M/UL — SIGNIFICANT CHANGE UP (ref 3.8–5.2)
RBC # FLD: 13.3 % — SIGNIFICANT CHANGE UP (ref 10.3–14.5)
SODIUM SERPL-SCNC: 139 MMOL/L — SIGNIFICANT CHANGE UP (ref 135–145)
VANCOMYCIN FLD-MCNC: 6.8 UG/ML — SIGNIFICANT CHANGE UP
WBC # BLD: 7.14 K/UL — SIGNIFICANT CHANGE UP (ref 3.8–10.5)
WBC # FLD AUTO: 7.14 K/UL — SIGNIFICANT CHANGE UP (ref 3.8–10.5)

## 2025-06-06 PROCEDURE — 99222 1ST HOSP IP/OBS MODERATE 55: CPT

## 2025-06-06 PROCEDURE — 99232 SBSQ HOSP IP/OBS MODERATE 35: CPT

## 2025-06-06 PROCEDURE — G0545: CPT

## 2025-06-06 RX ORDER — VANCOMYCIN HCL IN 5 % DEXTROSE 1.5G/250ML
1250 PLASTIC BAG, INJECTION (ML) INTRAVENOUS EVERY 24 HOURS
Refills: 0 | Status: DISCONTINUED | OUTPATIENT
Start: 2025-06-06 | End: 2025-06-06

## 2025-06-06 RX ORDER — VANCOMYCIN HCL IN 5 % DEXTROSE 1.5G/250ML
1500 PLASTIC BAG, INJECTION (ML) INTRAVENOUS EVERY 24 HOURS
Refills: 0 | Status: DISCONTINUED | OUTPATIENT
Start: 2025-06-06 | End: 2025-06-08

## 2025-06-06 RX ADMIN — METOPROLOL SUCCINATE 50 MILLIGRAM(S): 50 TABLET, EXTENDED RELEASE ORAL at 05:32

## 2025-06-06 RX ADMIN — ATORVASTATIN CALCIUM 80 MILLIGRAM(S): 80 TABLET, FILM COATED ORAL at 21:35

## 2025-06-06 RX ADMIN — Medication 25 GRAM(S): at 17:34

## 2025-06-06 RX ADMIN — Medication 300 MILLIGRAM(S): at 12:48

## 2025-06-06 RX ADMIN — OXYCODONE HYDROCHLORIDE 5 MILLIGRAM(S): 30 TABLET ORAL at 00:57

## 2025-06-06 RX ADMIN — EZETIMIBE 10 MILLIGRAM(S): 10 TABLET ORAL at 12:49

## 2025-06-06 RX ADMIN — Medication 2 TABLET(S): at 21:35

## 2025-06-06 RX ADMIN — CLOPIDOGREL BISULFATE 75 MILLIGRAM(S): 75 TABLET, FILM COATED ORAL at 12:49

## 2025-06-06 RX ADMIN — Medication 25 GRAM(S): at 08:03

## 2025-06-06 RX ADMIN — INSULIN LISPRO 4: 100 INJECTION, SOLUTION INTRAVENOUS; SUBCUTANEOUS at 12:49

## 2025-06-06 NOTE — PROGRESS NOTE ADULT - ASSESSMENT
75-year-old female with past medical history of hypertension, hyperlipidemia, diabetes, CAD with stent placement, presents today with lower extremity cellulitis that failed outpatient oral antibiotic therapy, admitted for IV antibiotic therapy    #Cellulitis lower extremities  Patient presents with left lower extremity cellulitis that has been worsening despite outpatient antibiotic therapy. Patient cannot recall name of antibiotic provided but as per chart review, patient was prescribed a course of keflex on 4/2. Physical exam reveals erythema of left lower extremity with yellowing and crusting of the periphery of the region   - ID input appreciated, change to IV Cefazolin.    - MRSA ordered  - Blood cultures ordered  - Wound care evaluation.     # ? PVD - pt has been referred to Dr. Montero for outpatient vascular evaluation.  Will check LE arterial dopplers.   # Diabetes Type II - monitor fingersticks.  Insulin coverage for hyperglycemia.   # HTN: Continue metoprolol  # HLD: Continue atorvastatin and ezetimibe  # CAD s/p stent: Continue plavix    DVT PPx: Low risk for VTE  Code: Full  Medication list acquired from patient

## 2025-06-06 NOTE — PROGRESS NOTE ADULT - SUBJECTIVE AND OBJECTIVE BOX
Patient: JOE FISH [id number] 75y Female                            Hospitalist Attending Note    No complaints.   No fever / chills    ____________________PHYSICAL EXAM:  GENERAL:  NAD Alert and Oriented x 3   HEENT: NCAT  CARDIOVASCULAR:  S1, S2  LUNGS: CTAB  ABDOMEN:  soft, (-) tenderness, (-) distension, (+) bowel sounds, (-) guarding, (-) rebound (-) rigidity  EXTREMITIES:  no cyanosis / clubbing / edema.  LLE open wounds with clean base, minimal surrounding erythema.   ____________________      VITALS:  Vital Signs Last 24 Hrs  T(C): 36.7 (06 Jun 2025 13:13), Max: 37.2 (05 Jun 2025 23:50)  T(F): 98 (06 Jun 2025 13:13), Max: 98.9 (05 Jun 2025 23:50)  HR: 68 (06 Jun 2025 13:13) (67 - 78)  BP: 116/53 (06 Jun 2025 13:13) (114/61 - 161/85)  BP(mean): --  RR: 18 (06 Jun 2025 13:13) (17 - 18)  SpO2: 97% (06 Jun 2025 13:13) (94% - 97%)    Parameters below as of 06 Jun 2025 13:13  Patient On (Oxygen Delivery Method): room air     Daily     Daily   CAPILLARY BLOOD GLUCOSE      POCT Blood Glucose.: 128 mg/dL (06 Jun 2025 16:31)  POCT Blood Glucose.: 246 mg/dL (06 Jun 2025 12:29)  POCT Blood Glucose.: 108 mg/dL (06 Jun 2025 08:13)  POCT Blood Glucose.: 145 mg/dL (05 Jun 2025 21:18)    I&O's Summary    05 Jun 2025 07:01  -  06 Jun 2025 07:00  --------------------------------------------------------  IN: 460 mL / OUT: 0 mL / NET: 460 mL        LABS:                        10.8   7.14  )-----------( 244      ( 06 Jun 2025 08:04 )             35.0     06-06    139  |  108  |  14  ----------------------------<  108[H]  4.1   |  24  |  0.75    Ca    8.5      06 Jun 2025 08:04    TPro  6.5  /  Alb  2.6[L]  /  TBili  0.7  /  DBili  x   /  AST  16  /  ALT  13  /  AlkPhos  78  06-05      LIVER FUNCTIONS - ( 05 Jun 2025 07:52 )  Alb: 2.6 g/dL / Pro: 6.5 gm/dL / ALK PHOS: 78 U/L / ALT: 13 U/L / AST: 16 U/L / GGT: x           Urinalysis Basic - ( 06 Jun 2025 08:04 )    Color: x / Appearance: x / SG: x / pH: x  Gluc: 108 mg/dL / Ketone: x  / Bili: x / Urobili: x   Blood: x / Protein: x / Nitrite: x   Leuk Esterase: x / RBC: x / WBC x   Sq Epi: x / Non Sq Epi: x / Bacteria: x              MEDICATIONS:  acetaminophen     Tablet .. 975 milliGRAM(s) Oral every 8 hours PRN  aluminum hydroxide/magnesium hydroxide/simethicone Suspension 30 milliLiter(s) Oral every 4 hours PRN  atorvastatin 80 milliGRAM(s) Oral at bedtime  bisacodyl 5 milliGRAM(s) Oral daily PRN  clopidogrel Tablet 75 milliGRAM(s) Oral daily  dextrose 5%. 1000 milliLiter(s) IV Continuous <Continuous>  dextrose 50% Injectable 25 Gram(s) IV Push once  dextrose Oral Gel 15 Gram(s) Oral once PRN  ezetimibe 10 milliGRAM(s) Oral daily  glucagon  Injectable 1 milliGRAM(s) IntraMuscular once  insulin lispro (ADMELOG) corrective regimen sliding scale   SubCutaneous three times a day before meals  melatonin 3 milliGRAM(s) Oral at bedtime PRN  metoprolol succinate ER 50 milliGRAM(s) Oral daily  naloxone Injectable 0.4 milliGRAM(s) IV Push once  ondansetron Injectable 4 milliGRAM(s) IV Push every 8 hours PRN  oxyCODONE    IR 2.5 milliGRAM(s) Oral every 4 hours PRN  oxyCODONE    IR 5 milliGRAM(s) Oral every 4 hours PRN  piperacillin/tazobactam IVPB.. 3.375 Gram(s) IV Intermittent every 8 hours  polyethylene glycol 3350 17 Gram(s) Oral daily  senna 2 Tablet(s) Oral at bedtime  vancomycin  IVPB 1500 milliGRAM(s) IV Intermittent every 24 hours

## 2025-06-06 NOTE — CONSULT NOTE ADULT - SUBJECTIVE AND OBJECTIVE BOX
HPI:  75-year-old female with past medical history of hypertension, hyperlipidemia, diabetes, CAD with stent placement, presents today with lower extremity cellulitis that failed outpatient oral antibiotic therapy. Patient states that about 1-2 months ago, she had a cut on her left lower extremity that soon became infected and the area became erythematous. Patient had visited an outpatient facility twice for antibiotic therapy but the infection failed to respond to both of these antibiotics. Patient cannot recall name of antibiotics prescribed but as per chart review, patient was prescribed Keflex on 4/2/2025. Patient states she completed her antibiotic course. Patient states that area has been draining watery pus. She also notes area has been erythematous. Patient denies fever, chills, nausea, vomiting, and diarrhea.       ED course: Patient afebrile (although T 99.3) and hemodynamically stable. Labs remarkable for WBC 11.00. Duplex US b/l LE shows no evidence of deep venous thrombosis in either lower extremity. (04 Jun 2025 03:17)      PAST MEDICAL & SURGICAL HISTORY:  CAD (coronary artery disease)      HTN (hypertension)      Kidney stones      Diabetes      Stented coronary artery      Stented coronary artery          Allergies    No Known Allergies    Intolerances        ANTIMICROBIALS:  piperacillin/tazobactam IVPB.. 3.375 every 8 hours  vancomycin  IVPB 1000 every 24 hours      OTHER MEDS:  acetaminophen     Tablet .. 975 milliGRAM(s) Oral every 8 hours PRN  aluminum hydroxide/magnesium hydroxide/simethicone Suspension 30 milliLiter(s) Oral every 4 hours PRN  atorvastatin 80 milliGRAM(s) Oral at bedtime  bisacodyl 5 milliGRAM(s) Oral daily PRN  clopidogrel Tablet 75 milliGRAM(s) Oral daily  dextrose 5%. 1000 milliLiter(s) IV Continuous <Continuous>  dextrose 50% Injectable 25 Gram(s) IV Push once  dextrose Oral Gel 15 Gram(s) Oral once PRN  ezetimibe 10 milliGRAM(s) Oral daily  glucagon  Injectable 1 milliGRAM(s) IntraMuscular once  insulin lispro (ADMELOG) corrective regimen sliding scale   SubCutaneous three times a day before meals  melatonin 3 milliGRAM(s) Oral at bedtime PRN  metoprolol succinate ER 50 milliGRAM(s) Oral daily  naloxone Injectable 0.4 milliGRAM(s) IV Push once  ondansetron Injectable 4 milliGRAM(s) IV Push every 8 hours PRN  oxyCODONE    IR 2.5 milliGRAM(s) Oral every 4 hours PRN  oxyCODONE    IR 5 milliGRAM(s) Oral every 4 hours PRN  polyethylene glycol 3350 17 Gram(s) Oral daily  senna 2 Tablet(s) Oral at bedtime      SOCIAL HISTORY:    Marital Status:    Occupation:   Lives with:     Substance Use (street drugs):   Tobacco Usage:    Alcohol Usage: Social EtOH    FAMILY HISTORY:      ROS:  Unobtainable because:   All other systems negative     Constitutional: no fever, no chills, no weight loss, no night sweats  Eye: no eye pain, no redness, no vision changes  ENT:  no sore throat, no rhinorrhea  Cardiovascular:  no chest pain, no palpitation  Respiratory:  no SOB, no cough  GI:  no abd pain, no vomiting, no diarrhea  urinary: no dysuria, no hematuria, no flank pain  : no  discharge or bleeding  musculoskeletal:  no joint pain, no joint swelling  skin:  no rash  neurology:  no headache, no seizure, no change in mental status  psych: no anxiety, no depression     Physical Exam:    General:    NAD, non toxic  Head: atraumatic, normocephalic  Eyes: normal sclera and conjunctiva  ENT:   no oropharyngeal lesions, no LAD, neck supple  Cardio:    regular S1,S2, no murmur  Respiratory:   clear to auscultation b/l, no wheezing  abd:   soft, BS +, not tender, no hepatosplenomegaly  :     no CVAT, no suprapubic tenderness, no vidales  Musculoskeletal : no joint swelling, no edema  Skin:    no rash  vascular:  normal pulses  Neurologic:     no focal deficits  psych: normal affect, no suicidal ideation      Drug Dosing Weight  Height (cm): 157.5 (03 Jun 2025 20:02)  Weight (kg): 79.4 (04 Jun 2025 05:33)  BMI (kg/m2): 32 (04 Jun 2025 05:33)  BSA (m2): 1.81 (04 Jun 2025 05:33)    Vital Signs Last 24 Hrs  T(F): 98.6 (06-06-25 @ 05:05), Max: 99.3 (06-03-25 @ 20:49)    Vital Signs Last 24 Hrs  HR: 67 (06-06-25 @ 05:05) (67 - 78)  BP: 147/75 (06-06-25 @ 05:05) (114/61 - 161/85)  RR: 17 (06-06-25 @ 05:05)  SpO2: 97% (06-06-25 @ 05:05) (94% - 97%)  Wt(kg): --                          10.8   7.14  )-----------( 244      ( 06 Jun 2025 08:04 )             35.0       06-06    139  |  108  |  14  ----------------------------<  108[H]  4.1   |  24  |  0.75    Ca    8.5      06 Jun 2025 08:04    TPro  6.5  /  Alb  2.6[L]  /  TBili  0.7  /  DBili  x   /  AST  16  /  ALT  13  /  AlkPhos  78  06-05      Urinalysis Basic - ( 06 Jun 2025 08:04 )    Color: x / Appearance: x / SG: x / pH: x  Gluc: 108 mg/dL / Ketone: x  / Bili: x / Urobili: x   Blood: x / Protein: x / Nitrite: x   Leuk Esterase: x / RBC: x / WBC x   Sq Epi: x / Non Sq Epi: x / Bacteria: x        MICROBIOLOGY:  Vancomycin Level, Random: 6.8 ug/mL (06-06-25 @ 08:04)  v              RADIOLOGY:       HPI:  Patient is a 75-year-old female with past medical history of hypertension, hyperlipidemia, diabetes, CAD with stent placement, presents today with lower extremity cellulitis that failed outpatient oral antibiotic therapy. Patient states that about 1-2 months ago, she had a cut on her left lower extremity that soon became infected and the area became erythematous. Patient had visited an outpatient facility twice for antibiotic therapy but the infection failed to respond to both of these antibiotics. Patient cannot recall name of antibiotics prescribed but as per chart review, patient was prescribed Keflex on 4/2/2025. Patient states she completed her antibiotic course. Patient states that area has been draining watery pus. She also notes area has been erythematous. Patient denies fever, chills, nausea, vomiting, and diarrhea.       ED course: Patient afebrile (although T 99.3) and hemodynamically stable. Labs remarkable for WBC 11.00. Duplex US b/l LE shows no evidence of deep venous thrombosis in either lower extremity. (04 Jun 2025 03:17)    Infectious Disease Consultation requested today to help with abx management.    Patient seen and examined today  she is awake. alert  denies any fever or chills  states she has had the Left lower leg wound  for about a month after she had a small cut that became infected that did not improve with po abx that she was Rx as outpatient and she started to have d/c from the wound which prompted her ER visit here      she states her wound is better  since admission here and that she is getting good wound care here  she denies any GI complaints            PAST MEDICAL & SURGICAL HISTORY:  CAD (coronary artery disease)      HTN (hypertension)      Kidney stones      Diabetes      Stented coronary artery      Stented coronary artery          Allergies    No Known Drug Allergies        ANTIMICROBIALS:  piperacillin/tazobactam IVPB.. 3.375 every 8 hours  vancomycin  IVPB 1000 every 24 hours      OTHER MEDS:  acetaminophen     Tablet .. 975 milliGRAM(s) Oral every 8 hours PRN  aluminum hydroxide/magnesium hydroxide/simethicone Suspension 30 milliLiter(s) Oral every 4 hours PRN  atorvastatin 80 milliGRAM(s) Oral at bedtime  bisacodyl 5 milliGRAM(s) Oral daily PRN  clopidogrel Tablet 75 milliGRAM(s) Oral daily  dextrose 5%. 1000 milliLiter(s) IV Continuous <Continuous>  dextrose 50% Injectable 25 Gram(s) IV Push once  dextrose Oral Gel 15 Gram(s) Oral once PRN  ezetimibe 10 milliGRAM(s) Oral daily  glucagon  Injectable 1 milliGRAM(s) IntraMuscular once  insulin lispro (ADMELOG) corrective regimen sliding scale   SubCutaneous three times a day before meals  melatonin 3 milliGRAM(s) Oral at bedtime PRN  metoprolol succinate ER 50 milliGRAM(s) Oral daily  naloxone Injectable 0.4 milliGRAM(s) IV Push once  ondansetron Injectable 4 milliGRAM(s) IV Push every 8 hours PRN  oxyCODONE    IR 2.5 milliGRAM(s) Oral every 4 hours PRN  oxyCODONE    IR 5 milliGRAM(s) Oral every 4 hours PRN  polyethylene glycol 3350 17 Gram(s) Oral daily  senna 2 Tablet(s) Oral at bedtime      SOCIAL HISTORY:    Lives with:  lives at home      ROS:  Unobtainable because:   All other systems negative     Constitutional: no fever, no chills, no weight loss, no night sweats  Eye: no eye pain, no redness, no vision changes  ENT:  no sore throat, no rhinorrhea  Cardiovascular:  no chest pain, no palpitation  Respiratory:  no SOB, no cough  GI:  no abd pain, no vomiting, no diarrhea  urinary: no dysuria, no hematuria, no flank pain  : no  discharge or bleeding  musculoskeletal:  LLE unhealed wound  skin:  no rash  neurology:  no headache,    Physical Exam:    General:    NAD, non toxic  Head: atraumatic, normocephalic  Eyes: normal sclera and conjunctiva  ENT:   no oropharyngeal lesions, no LAD, neck supple  Cardio:    regular S1,S2  Respiratory:   clear to auscultation b/l, no wheezing  abd:   soft, BS +, not tender, no distention  :     no CVAT, no suprapubic tenderness, no vidales  Musculoskeletal : no joint swelling, Left lower extremity above ankle region with large open wound around the shin region, looks to be mostly denuded skin and superficial  does have scant purulent  on the dressing , no malodor, no edema   Skin:    no rash  Neurologic:     no focal deficits  psych: normal affect      Drug Dosing Weight  Height (cm): 157.5 (03 Jun 2025 20:02)  Weight (kg): 79.4 (04 Jun 2025 05:33)  BMI (kg/m2): 32 (04 Jun 2025 05:33)  BSA (m2): 1.81 (04 Jun 2025 05:33)    Vital Signs Last 24 Hrs  T(F): 98.6 (06-06-25 @ 05:05), Max: 99.3 (06-03-25 @ 20:49)    Vital Signs Last 24 Hrs  HR: 67 (06-06-25 @ 05:05) (67 - 78)  BP: 147/75 (06-06-25 @ 05:05) (114/61 - 161/85)  RR: 17 (06-06-25 @ 05:05)  SpO2: 97% (06-06-25 @ 05:05) (94% - 97%)  Wt(kg): --                          10.8   7.14  )-----------( 244      ( 06 Jun 2025 08:04 )             35.0       06-06    139  |  108  |  14  ----------------------------<  108[H]  4.1   |  24  |  0.75    Ca    8.5      06 Jun 2025 08:04    TPro  6.5  /  Alb  2.6[L]  /  TBili  0.7  /  DBili  x   /  AST  16  /  ALT  13  /  AlkPhos  78  06-05      Urinalysis Basic - ( 06 Jun 2025 08:04 )    Color: x / Appearance: x / SG: x / pH: x  Gluc: 108 mg/dL / Ketone: x  / Bili: x / Urobili: x   Blood: x / Protein: x / Nitrite: x   Leuk Esterase: x / RBC: x / WBC x   Sq Epi: x / Non Sq Epi: x / Bacteria: x        MICROBIOLOGY:  Vancomycin Level, Random: 6.8 ug/mL (06-06-25 @ 08:04)      RADIOLOGY:      < from: US Duplex Venous Lower Ext Complete, Bilateral (06.03.25 @ 23:56) >  ACC: [id number] EXAM:  US DPLX LWR EXT VEINS COMPL BI   ORDERED BY: JJ MALDONADO     PROCEDURE DATE:  06/03/2025          INTERPRETATION:  CLINICAL INFORMATION: Rule out DVT    COMPARISON: None available.    TECHNIQUE: Duplex sonography of theBILATERAL LOWER extremity veins with   color and spectral Doppler, with and without compression.    FINDINGS:    RIGHT:  Normal compressibility of the RIGHT common femoral, femoral and popliteal   veins.  Doppler examination shows normal spontaneous and phasic flow.  No RIGHT calf vein thrombosis is detected.    LEFT:  Normal compressibility of the LEFT common femoral, femoral and popliteal   veins.  Doppler examination shows normal spontaneous and phasic flow.  No LEFT calf vein thrombosis is detected.    IMPRESSION:  No evidence of deep venous thrombosis in either lower extremity.        LAM CAMERON MD  This document has been electronically signed. Jun 4 2025 12:56AM    < end of copied text >

## 2025-06-07 LAB
GLUCOSE BLDC GLUCOMTR-MCNC: 119 MG/DL — HIGH (ref 70–99)
GLUCOSE BLDC GLUCOMTR-MCNC: 122 MG/DL — HIGH (ref 70–99)
GLUCOSE BLDC GLUCOMTR-MCNC: 164 MG/DL — HIGH (ref 70–99)
GLUCOSE BLDC GLUCOMTR-MCNC: 224 MG/DL — HIGH (ref 70–99)
HCT VFR BLD CALC: 33.7 % — LOW (ref 34.5–45)
HGB BLD-MCNC: 10.9 G/DL — LOW (ref 11.5–15.5)
MCHC RBC-ENTMCNC: 25.8 PG — LOW (ref 27–34)
MCHC RBC-ENTMCNC: 32.3 G/DL — SIGNIFICANT CHANGE UP (ref 32–36)
MCV RBC AUTO: 79.7 FL — LOW (ref 80–100)
NRBC BLD AUTO-RTO: 0 /100 WBCS — SIGNIFICANT CHANGE UP (ref 0–0)
PLATELET # BLD AUTO: 256 K/UL — SIGNIFICANT CHANGE UP (ref 150–400)
RBC # BLD: 4.23 M/UL — SIGNIFICANT CHANGE UP (ref 3.8–5.2)
RBC # FLD: 13.2 % — SIGNIFICANT CHANGE UP (ref 10.3–14.5)
VANCOMYCIN FLD-MCNC: 12 UG/ML — SIGNIFICANT CHANGE UP
WBC # BLD: 7.63 K/UL — SIGNIFICANT CHANGE UP (ref 3.8–10.5)
WBC # FLD AUTO: 7.63 K/UL — SIGNIFICANT CHANGE UP (ref 3.8–10.5)

## 2025-06-07 PROCEDURE — 99232 SBSQ HOSP IP/OBS MODERATE 35: CPT

## 2025-06-07 PROCEDURE — 93925 LOWER EXTREMITY STUDY: CPT | Mod: 26

## 2025-06-07 RX ADMIN — CLOPIDOGREL BISULFATE 75 MILLIGRAM(S): 75 TABLET, FILM COATED ORAL at 11:41

## 2025-06-07 RX ADMIN — EZETIMIBE 10 MILLIGRAM(S): 10 TABLET ORAL at 11:41

## 2025-06-07 RX ADMIN — Medication 300 MILLIGRAM(S): at 14:12

## 2025-06-07 RX ADMIN — Medication 25 GRAM(S): at 17:46

## 2025-06-07 RX ADMIN — Medication 25 GRAM(S): at 08:42

## 2025-06-07 RX ADMIN — Medication 25 GRAM(S): at 01:01

## 2025-06-07 RX ADMIN — OXYCODONE HYDROCHLORIDE 5 MILLIGRAM(S): 30 TABLET ORAL at 02:21

## 2025-06-07 RX ADMIN — OXYCODONE HYDROCHLORIDE 5 MILLIGRAM(S): 30 TABLET ORAL at 03:21

## 2025-06-07 RX ADMIN — METOPROLOL SUCCINATE 50 MILLIGRAM(S): 50 TABLET, EXTENDED RELEASE ORAL at 06:40

## 2025-06-07 RX ADMIN — ATORVASTATIN CALCIUM 80 MILLIGRAM(S): 80 TABLET, FILM COATED ORAL at 21:14

## 2025-06-07 RX ADMIN — INSULIN LISPRO 2: 100 INJECTION, SOLUTION INTRAVENOUS; SUBCUTANEOUS at 12:34

## 2025-06-07 RX ADMIN — Medication 25 GRAM(S): at 23:58

## 2025-06-07 RX ADMIN — INSULIN LISPRO 4: 100 INJECTION, SOLUTION INTRAVENOUS; SUBCUTANEOUS at 16:37

## 2025-06-07 NOTE — PHYSICAL THERAPY INITIAL EVALUATION ADULT - GENERAL OBSERVATIONS, REHAB EVAL
Pt was seen in semi-supine c IV+ and L lower leg dressing C/D/I, alert and Ox4. Pt was comfortable and motivated.   P.T. wound care initial evaluation performed. L anterior and psost lower leg c cellulitis and R lower leg c a closed blister from fluid overload.  All wounds were cleaned, dressing applied and documented in flowsheet A&I  and c recommendation in flowsheet plan of care.

## 2025-06-07 NOTE — PHYSICAL THERAPY INITIAL EVALUATION ADULT - GAIT DEVIATIONS NOTED, PT EVAL
decreased micheal/increased time in double stance/decreased velocity of limb motion/decreased step length/decreased stride length

## 2025-06-07 NOTE — PHYSICAL THERAPY INITIAL EVALUATION ADULT - PERTINENT HX OF CURRENT PROBLEM, REHAB EVAL
75-year-old female with past medical history of hypertension, hyperlipidemia, diabetes, CAD with stent placement, presents today with lower extremity cellulitis that failed outpatient oral antibiotic therapy, admitted for IV antibiotic therapy

## 2025-06-07 NOTE — PHYSICAL THERAPY INITIAL EVALUATION ADULT - TRANSFER TRAINING, PT EVAL
To be able to perform transfers Independently, including bed to chair, chair to bed and chair to chair In order to facilitate safety with appropriate technique and appropriate assist device by discharge.

## 2025-06-07 NOTE — PROGRESS NOTE ADULT - SUBJECTIVE AND OBJECTIVE BOX
Patient: JOE FISH [id number] 75y Female                            Hospitalist Attending Note    No complaints.   No fever / chills    ____________________PHYSICAL EXAM:  GENERAL:  NAD Alert and Oriented x 3   HEENT: NCAT  CARDIOVASCULAR:  S1, S2  LUNGS: CTAB  ABDOMEN:  soft, (-) tenderness, (-) distension, (+) bowel sounds, (-) guarding, (-) rebound (-) rigidity  EXTREMITIES:  no cyanosis / clubbing / edema.  LLE open wounds with clean base, minimal surrounding erythema.   ____________________      VITALS:  Vital Signs Last 24 Hrs  T(C): 36.7 (07 Jun 2025 17:30), Max: 36.9 (06 Jun 2025 23:30)  T(F): 98 (07 Jun 2025 17:30), Max: 98.5 (06 Jun 2025 23:30)  HR: 79 (07 Jun 2025 17:30) (68 - 79)  BP: 144/80 (07 Jun 2025 17:30) (116/72 - 148/77)  BP(mean): --  RR: 17 (07 Jun 2025 17:30) (17 - 18)  SpO2: 96% (07 Jun 2025 17:30) (95% - 98%)    Parameters below as of 07 Jun 2025 17:30  Patient On (Oxygen Delivery Method): room air     Daily     Daily   CAPILLARY BLOOD GLUCOSE      POCT Blood Glucose.: 224 mg/dL (07 Jun 2025 16:24)  POCT Blood Glucose.: 164 mg/dL (07 Jun 2025 12:03)  POCT Blood Glucose.: 119 mg/dL (07 Jun 2025 07:55)  POCT Blood Glucose.: 184 mg/dL (06 Jun 2025 21:32)    I&O's Summary    06 Jun 2025 07:01  -  07 Jun 2025 07:00  --------------------------------------------------------  IN: 240 mL / OUT: 0 mL / NET: 240 mL        LABS:                        10.9   7.63  )-----------( 256      ( 07 Jun 2025 07:08 )             33.7     06-06    139  |  108  |  14  ----------------------------<  108[H]  4.1   |  24  |  0.75    Ca    8.5      06 Jun 2025 08:04          Urinalysis Basic - ( 06 Jun 2025 08:04 )    Color: x / Appearance: x / SG: x / pH: x  Gluc: 108 mg/dL / Ketone: x  / Bili: x / Urobili: x   Blood: x / Protein: x / Nitrite: x   Leuk Esterase: x / RBC: x / WBC x   Sq Epi: x / Non Sq Epi: x / Bacteria: x              MEDICATIONS:  acetaminophen     Tablet .. 975 milliGRAM(s) Oral every 8 hours PRN  aluminum hydroxide/magnesium hydroxide/simethicone Suspension 30 milliLiter(s) Oral every 4 hours PRN  atorvastatin 80 milliGRAM(s) Oral at bedtime  bisacodyl 5 milliGRAM(s) Oral daily PRN  clopidogrel Tablet 75 milliGRAM(s) Oral daily  dextrose 5%. 1000 milliLiter(s) IV Continuous <Continuous>  dextrose 50% Injectable 25 Gram(s) IV Push once  dextrose Oral Gel 15 Gram(s) Oral once PRN  ezetimibe 10 milliGRAM(s) Oral daily  glucagon  Injectable 1 milliGRAM(s) IntraMuscular once  insulin lispro (ADMELOG) corrective regimen sliding scale   SubCutaneous three times a day before meals  melatonin 3 milliGRAM(s) Oral at bedtime PRN  metoprolol succinate ER 50 milliGRAM(s) Oral daily  naloxone Injectable 0.4 milliGRAM(s) IV Push once  ondansetron Injectable 4 milliGRAM(s) IV Push every 8 hours PRN  oxyCODONE    IR 2.5 milliGRAM(s) Oral every 4 hours PRN  oxyCODONE    IR 5 milliGRAM(s) Oral every 4 hours PRN  piperacillin/tazobactam IVPB.. 3.375 Gram(s) IV Intermittent every 8 hours  polyethylene glycol 3350 17 Gram(s) Oral daily  senna 2 Tablet(s) Oral at bedtime  vancomycin  IVPB 1500 milliGRAM(s) IV Intermittent every 24 hours

## 2025-06-07 NOTE — PHYSICAL THERAPY INITIAL EVALUATION ADULT - GAIT TRAINING, PT EVAL
Pt will ambulate 200 feet with rolling walker and negotiate 12 steps c one rail independently, without loss of balance, by 2 weeks.

## 2025-06-07 NOTE — PHYSICAL THERAPY INITIAL EVALUATION ADULT - ADDITIONAL COMMENTS
As per pt : There is one flight of steps, c R rail down from street level to the basement apt and steps to negotiate once in the basement.

## 2025-06-08 LAB
ANION GAP SERPL CALC-SCNC: 7 MMOL/L — SIGNIFICANT CHANGE UP (ref 5–17)
BUN SERPL-MCNC: 14 MG/DL — SIGNIFICANT CHANGE UP (ref 7–23)
CALCIUM SERPL-MCNC: 8.9 MG/DL — SIGNIFICANT CHANGE UP (ref 8.5–10.1)
CHLORIDE SERPL-SCNC: 109 MMOL/L — HIGH (ref 96–108)
CO2 SERPL-SCNC: 23 MMOL/L — SIGNIFICANT CHANGE UP (ref 22–31)
CREAT SERPL-MCNC: 0.77 MG/DL — SIGNIFICANT CHANGE UP (ref 0.5–1.3)
EGFR: 81 ML/MIN/1.73M2 — SIGNIFICANT CHANGE UP
EGFR: 81 ML/MIN/1.73M2 — SIGNIFICANT CHANGE UP
GLUCOSE BLDC GLUCOMTR-MCNC: 112 MG/DL — HIGH (ref 70–99)
GLUCOSE BLDC GLUCOMTR-MCNC: 166 MG/DL — HIGH (ref 70–99)
GLUCOSE BLDC GLUCOMTR-MCNC: 184 MG/DL — HIGH (ref 70–99)
GLUCOSE BLDC GLUCOMTR-MCNC: 211 MG/DL — HIGH (ref 70–99)
GLUCOSE SERPL-MCNC: 110 MG/DL — HIGH (ref 70–99)
HCT VFR BLD CALC: 32.6 % — LOW (ref 34.5–45)
HGB BLD-MCNC: 10.5 G/DL — LOW (ref 11.5–15.5)
MCHC RBC-ENTMCNC: 25.5 PG — LOW (ref 27–34)
MCHC RBC-ENTMCNC: 32.2 G/DL — SIGNIFICANT CHANGE UP (ref 32–36)
MCV RBC AUTO: 79.3 FL — LOW (ref 80–100)
NRBC BLD AUTO-RTO: 0 /100 WBCS — SIGNIFICANT CHANGE UP (ref 0–0)
PLATELET # BLD AUTO: 272 K/UL — SIGNIFICANT CHANGE UP (ref 150–400)
POTASSIUM SERPL-MCNC: 3.9 MMOL/L — SIGNIFICANT CHANGE UP (ref 3.5–5.3)
POTASSIUM SERPL-SCNC: 3.9 MMOL/L — SIGNIFICANT CHANGE UP (ref 3.5–5.3)
RBC # BLD: 4.11 M/UL — SIGNIFICANT CHANGE UP (ref 3.8–5.2)
RBC # FLD: 13.3 % — SIGNIFICANT CHANGE UP (ref 10.3–14.5)
SODIUM SERPL-SCNC: 139 MMOL/L — SIGNIFICANT CHANGE UP (ref 135–145)
VANCOMYCIN FLD-MCNC: 14.9 UG/ML — SIGNIFICANT CHANGE UP
WBC # BLD: 7.01 K/UL — SIGNIFICANT CHANGE UP (ref 3.8–10.5)
WBC # FLD AUTO: 7.01 K/UL — SIGNIFICANT CHANGE UP (ref 3.8–10.5)

## 2025-06-08 PROCEDURE — 99232 SBSQ HOSP IP/OBS MODERATE 35: CPT

## 2025-06-08 RX ORDER — ENOXAPARIN SODIUM 100 MG/ML
40 INJECTION SUBCUTANEOUS EVERY 24 HOURS
Refills: 0 | Status: DISCONTINUED | OUTPATIENT
Start: 2025-06-08 | End: 2025-06-11

## 2025-06-08 RX ORDER — CEFAZOLIN SODIUM IN 0.9 % NACL 3 G/100 ML
2000 INTRAVENOUS SOLUTION, PIGGYBACK (ML) INTRAVENOUS EVERY 8 HOURS
Refills: 0 | Status: DISCONTINUED | OUTPATIENT
Start: 2025-06-09 | End: 2025-06-11

## 2025-06-08 RX ADMIN — ATORVASTATIN CALCIUM 80 MILLIGRAM(S): 80 TABLET, FILM COATED ORAL at 21:16

## 2025-06-08 RX ADMIN — ENOXAPARIN SODIUM 40 MILLIGRAM(S): 100 INJECTION SUBCUTANEOUS at 17:30

## 2025-06-08 RX ADMIN — Medication 25 GRAM(S): at 08:47

## 2025-06-08 RX ADMIN — Medication 300 MILLIGRAM(S): at 13:00

## 2025-06-08 RX ADMIN — INSULIN LISPRO 4: 100 INJECTION, SOLUTION INTRAVENOUS; SUBCUTANEOUS at 16:48

## 2025-06-08 RX ADMIN — INSULIN LISPRO 2: 100 INJECTION, SOLUTION INTRAVENOUS; SUBCUTANEOUS at 13:05

## 2025-06-08 RX ADMIN — CLOPIDOGREL BISULFATE 75 MILLIGRAM(S): 75 TABLET, FILM COATED ORAL at 12:10

## 2025-06-08 RX ADMIN — EZETIMIBE 10 MILLIGRAM(S): 10 TABLET ORAL at 12:10

## 2025-06-08 RX ADMIN — Medication 3 MILLIGRAM(S): at 21:15

## 2025-06-08 RX ADMIN — METOPROLOL SUCCINATE 50 MILLIGRAM(S): 50 TABLET, EXTENDED RELEASE ORAL at 05:15

## 2025-06-08 RX ADMIN — Medication 25 GRAM(S): at 17:29

## 2025-06-08 NOTE — PHARMACOTHERAPY INTERVENTION NOTE - NSPHARMCOMMASP
ASP - Lab/ test recommended
ASP - Dose optimization/Non-Renal dose adjustment
ASP - Lab/ test recommended
ASP - Dose optimization/Non-Renal dose adjustment
ASP - Lab/ test recommended

## 2025-06-08 NOTE — PROGRESS NOTE ADULT - ASSESSMENT
75-year-old female with past medical history of hypertension, hyperlipidemia, diabetes, CAD with stent placement, presents with lower extremity cellulitis that failed outpatient oral antibiotic therapy, admitted for IV antibiotic therapy    #Cellulitis lower extremities  Patient presents with left lower extremity cellulitis that has been worsening despite outpatient antibiotic therapy. Patient cannot recall name of antibiotic provided but as per chart review, patient was prescribed a course of keflex on 4/2. Physical exam reveals erythema of left lower extremity with yellowing and crusting of the periphery of the region   - ID input appreciated, change to IV Cefazolin.    - MRSA ordered  - Blood cultures ordered  - Wound care evaluation.     # ? PVD - pt has been referred to Dr. Montero for outpatient vascular evaluation.  Will check LE arterial dopplers.   # Diabetes Type II - monitor fingersticks.  Insulin coverage for hyperglycemia.   # HTN: Continue metoprolol  # HLD: Continue atorvastatin and ezetimibe  # CAD s/p stent: Continue plavix  # Inpatient DVT Prophylaxis - Lovenox subcut Limited mobility.

## 2025-06-08 NOTE — PROGRESS NOTE ADULT - SUBJECTIVE AND OBJECTIVE BOX
Patient: JOE FISH [id number] 75y Female                            Hospitalist Attending Note    No complaints.   No fever / chills    ____________________PHYSICAL EXAM:  GENERAL:  NAD Alert and Oriented x 3   HEENT: NCAT  CARDIOVASCULAR:  S1, S2  LUNGS: CTAB  ABDOMEN:  soft, (-) tenderness, (-) distension, (+) bowel sounds, (-) guarding, (-) rebound (-) rigidity  EXTREMITIES:  no cyanosis / clubbing / edema.  LLE open wounds with clean base, minimal surrounding erythema.   ____________________      VITALS:  Vital Signs Last 24 Hrs  T(C): 36.7 (08 Jun 2025 12:25), Max: 36.8 (08 Jun 2025 00:17)  T(F): 98.1 (08 Jun 2025 12:25), Max: 98.2 (08 Jun 2025 00:17)  HR: 77 (08 Jun 2025 12:25) (71 - 79)  BP: 120/78 (08 Jun 2025 12:25) (120/78 - 144/80)  BP(mean): --  RR: 17 (08 Jun 2025 12:25) (17 - 18)  SpO2: 98% (08 Jun 2025 12:25) (96% - 98%)    Parameters below as of 08 Jun 2025 12:25  Patient On (Oxygen Delivery Method): room air     Daily     Daily   CAPILLARY BLOOD GLUCOSE      POCT Blood Glucose.: 211 mg/dL (08 Jun 2025 16:09)  POCT Blood Glucose.: 166 mg/dL (08 Jun 2025 12:43)  POCT Blood Glucose.: 112 mg/dL (08 Jun 2025 08:38)  POCT Blood Glucose.: 122 mg/dL (07 Jun 2025 21:19)    I&O's Summary    07 Jun 2025 07:01  -  08 Jun 2025 07:00  --------------------------------------------------------  IN: 574 mL / OUT: 0 mL / NET: 574 mL        LABS:                        10.5   7.01  )-----------( 272      ( 08 Jun 2025 06:00 )             32.6     06-08    139  |  109[H]  |  14  ----------------------------<  110[H]  3.9   |  23  |  0.77    Ca    8.9      08 Jun 2025 06:00          Urinalysis Basic - ( 08 Jun 2025 06:00 )    Color: x / Appearance: x / SG: x / pH: x  Gluc: 110 mg/dL / Ketone: x  / Bili: x / Urobili: x   Blood: x / Protein: x / Nitrite: x   Leuk Esterase: x / RBC: x / WBC x   Sq Epi: x / Non Sq Epi: x / Bacteria: x              MEDICATIONS:  acetaminophen     Tablet .. 975 milliGRAM(s) Oral every 8 hours PRN  aluminum hydroxide/magnesium hydroxide/simethicone Suspension 30 milliLiter(s) Oral every 4 hours PRN  atorvastatin 80 milliGRAM(s) Oral at bedtime  bisacodyl 5 milliGRAM(s) Oral daily PRN  clopidogrel Tablet 75 milliGRAM(s) Oral daily  dextrose 5%. 1000 milliLiter(s) IV Continuous <Continuous>  dextrose 50% Injectable 25 Gram(s) IV Push once  dextrose Oral Gel 15 Gram(s) Oral once PRN  ezetimibe 10 milliGRAM(s) Oral daily  glucagon  Injectable 1 milliGRAM(s) IntraMuscular once  insulin lispro (ADMELOG) corrective regimen sliding scale   SubCutaneous three times a day before meals  melatonin 3 milliGRAM(s) Oral at bedtime PRN  metoprolol succinate ER 50 milliGRAM(s) Oral daily  naloxone Injectable 0.4 milliGRAM(s) IV Push once  ondansetron Injectable 4 milliGRAM(s) IV Push every 8 hours PRN  oxyCODONE    IR 2.5 milliGRAM(s) Oral every 4 hours PRN  oxyCODONE    IR 5 milliGRAM(s) Oral every 4 hours PRN  piperacillin/tazobactam IVPB.. 3.375 Gram(s) IV Intermittent every 8 hours  polyethylene glycol 3350 17 Gram(s) Oral daily  senna 2 Tablet(s) Oral at bedtime  vancomycin  IVPB 1500 milliGRAM(s) IV Intermittent every 24 hours

## 2025-06-09 LAB
GLUCOSE BLDC GLUCOMTR-MCNC: 123 MG/DL — HIGH (ref 70–99)
GLUCOSE BLDC GLUCOMTR-MCNC: 135 MG/DL — HIGH (ref 70–99)
GLUCOSE BLDC GLUCOMTR-MCNC: 146 MG/DL — HIGH (ref 70–99)
GLUCOSE BLDC GLUCOMTR-MCNC: 246 MG/DL — HIGH (ref 70–99)
HCT VFR BLD CALC: 34.8 % — SIGNIFICANT CHANGE UP (ref 34.5–45)
HGB BLD-MCNC: 11.1 G/DL — LOW (ref 11.5–15.5)
MCHC RBC-ENTMCNC: 25.7 PG — LOW (ref 27–34)
MCHC RBC-ENTMCNC: 31.9 G/DL — LOW (ref 32–36)
MCV RBC AUTO: 80.6 FL — SIGNIFICANT CHANGE UP (ref 80–100)
NRBC BLD AUTO-RTO: 0 /100 WBCS — SIGNIFICANT CHANGE UP (ref 0–0)
PLATELET # BLD AUTO: 292 K/UL — SIGNIFICANT CHANGE UP (ref 150–400)
RBC # BLD: 4.32 M/UL — SIGNIFICANT CHANGE UP (ref 3.8–5.2)
RBC # FLD: 13.4 % — SIGNIFICANT CHANGE UP (ref 10.3–14.5)
WBC # BLD: 6.83 K/UL — SIGNIFICANT CHANGE UP (ref 3.8–10.5)
WBC # FLD AUTO: 6.83 K/UL — SIGNIFICANT CHANGE UP (ref 3.8–10.5)

## 2025-06-09 PROCEDURE — G0545: CPT

## 2025-06-09 PROCEDURE — 99232 SBSQ HOSP IP/OBS MODERATE 35: CPT

## 2025-06-09 RX ORDER — INSULIN LISPRO 100 U/ML
5 INJECTION, SOLUTION INTRAVENOUS; SUBCUTANEOUS
Refills: 0 | Status: DISCONTINUED | OUTPATIENT
Start: 2025-06-09 | End: 2025-06-11

## 2025-06-09 RX ORDER — INSULIN GLARGINE-YFGN 100 [IU]/ML
10 INJECTION, SOLUTION SUBCUTANEOUS AT BEDTIME
Refills: 0 | Status: DISCONTINUED | OUTPATIENT
Start: 2025-06-09 | End: 2025-06-10

## 2025-06-09 RX ADMIN — Medication 100 MILLIGRAM(S): at 21:56

## 2025-06-09 RX ADMIN — CLOPIDOGREL BISULFATE 75 MILLIGRAM(S): 75 TABLET, FILM COATED ORAL at 11:41

## 2025-06-09 RX ADMIN — EZETIMIBE 10 MILLIGRAM(S): 10 TABLET ORAL at 11:41

## 2025-06-09 RX ADMIN — Medication 100 MILLIGRAM(S): at 05:48

## 2025-06-09 RX ADMIN — METOPROLOL SUCCINATE 50 MILLIGRAM(S): 50 TABLET, EXTENDED RELEASE ORAL at 05:52

## 2025-06-09 RX ADMIN — ATORVASTATIN CALCIUM 80 MILLIGRAM(S): 80 TABLET, FILM COATED ORAL at 21:56

## 2025-06-09 RX ADMIN — Medication 2 TABLET(S): at 21:56

## 2025-06-09 RX ADMIN — INSULIN GLARGINE-YFGN 10 UNIT(S): 100 INJECTION, SOLUTION SUBCUTANEOUS at 21:56

## 2025-06-09 RX ADMIN — INSULIN LISPRO 4: 100 INJECTION, SOLUTION INTRAVENOUS; SUBCUTANEOUS at 11:42

## 2025-06-09 RX ADMIN — INSULIN LISPRO 5 UNIT(S): 100 INJECTION, SOLUTION INTRAVENOUS; SUBCUTANEOUS at 17:13

## 2025-06-09 RX ADMIN — ENOXAPARIN SODIUM 40 MILLIGRAM(S): 100 INJECTION SUBCUTANEOUS at 17:13

## 2025-06-09 RX ADMIN — Medication 100 MILLIGRAM(S): at 14:46

## 2025-06-09 NOTE — PROGRESS NOTE ADULT - SUBJECTIVE AND OBJECTIVE BOX
JOE FISH  [id number]  75y (1950)    Follow Up:  non-healing wound    Interval History: The pt was seen and examined earlier, not in acute distress, no new complaints, awake and alert, appropriate. Pt is afebrile, RA, no leukocytosis.     PAST MEDICAL & SURGICAL HISTORY:  CAD (coronary artery disease)      HTN (hypertension)      Kidney stones      Diabetes      Stented coronary artery      Stented coronary artery          ROS:    [ ] Unobtainable because:  [x ] All other systems negative    Constitutional: no fever, no chills  Head: no trauma  Eyes: no vision changes, no eye pain  ENT:  no sore throat, no rhinorrhea  Cardiovascular:  no chest pain, no palpitation  Respiratory:  no SOB, no cough  GI:  no abd pain, no vomiting, no diarrhea  urinary: no dysuria, no hematuria, no flank pain  musculoskeletal:  no joint pain, no joint swelling  skin:  no rash  neurology:  no headache, no seizure, no change in mental status  psych: no anxiety, no depression         Allergies  No Known Allergies        ANTIMICROBIALS:  ceFAZolin   IVPB 2000 every 8 hours      OTHER MEDS:  acetaminophen     Tablet .. 975 milliGRAM(s) Oral every 8 hours PRN  aluminum hydroxide/magnesium hydroxide/simethicone Suspension 30 milliLiter(s) Oral every 4 hours PRN  atorvastatin 80 milliGRAM(s) Oral at bedtime  bisacodyl 5 milliGRAM(s) Oral daily PRN  clopidogrel Tablet 75 milliGRAM(s) Oral daily  dextrose 5%. 1000 milliLiter(s) IV Continuous <Continuous>  dextrose 50% Injectable 25 Gram(s) IV Push once  dextrose Oral Gel 15 Gram(s) Oral once PRN  enoxaparin Injectable 40 milliGRAM(s) SubCutaneous every 24 hours  ezetimibe 10 milliGRAM(s) Oral daily  glucagon  Injectable 1 milliGRAM(s) IntraMuscular once  insulin glargine Injectable (LANTUS) 10 Unit(s) SubCutaneous at bedtime  insulin lispro (ADMELOG) corrective regimen sliding scale   SubCutaneous three times a day before meals  insulin lispro Injectable (ADMELOG) 5 Unit(s) SubCutaneous three times a day before meals  melatonin 3 milliGRAM(s) Oral at bedtime PRN  metoprolol succinate ER 50 milliGRAM(s) Oral daily  naloxone Injectable 0.4 milliGRAM(s) IV Push once  ondansetron Injectable 4 milliGRAM(s) IV Push every 8 hours PRN  oxyCODONE    IR 2.5 milliGRAM(s) Oral every 4 hours PRN  oxyCODONE    IR 5 milliGRAM(s) Oral every 4 hours PRN  polyethylene glycol 3350 17 Gram(s) Oral daily  senna 2 Tablet(s) Oral at bedtime      Vital Signs Last 24 Hrs  T(C): 36.9 (09 Jun 2025 10:44), Max: 37.1 (09 Jun 2025 05:30)  T(F): 98.4 (09 Jun 2025 10:44), Max: 98.7 (09 Jun 2025 05:30)  HR: 75 (09 Jun 2025 15:15) (68 - 80)  BP: 129/76 (09 Jun 2025 15:15) (107/60 - 131/70)  BP(mean): --  RR: 18 (09 Jun 2025 15:15) (17 - 18)  SpO2: 100% (09 Jun 2025 15:15) (94% - 100%)    Parameters below as of 09 Jun 2025 15:15  Patient On (Oxygen Delivery Method): room air        Physical Exam:  General:    NAD, non toxic  Head: atraumatic, normocephalic  Eyes: normal sclera and conjunctiva  ENT:   no oropharyngeal lesions, no LAD, neck supple  Cardio:    regular S1,S2  Respiratory:   clear to auscultation b/l, no wheezing  abd:   soft, BS +, not tender, no distention  :     no CVAT, no suprapubic tenderness, no vidales  Musculoskeletal : no joint swelling, Left lower extremity above ankle region with large open wound around the shin region, looks to be mostly denuded skin and superficial, no malodor, no edema   Skin:    no rash  Neurologic:     no focal deficits  psych: normal affect    WBC Count: 6.83 K/uL (06-09 @ 06:30)  WBC Count: 7.01 K/uL (06-08 @ 06:00)  WBC Count: 7.63 K/uL (06-07 @ 07:08)  WBC Count: 7.14 K/uL (06-06 @ 08:04)  WBC Count: 8.12 K/uL (06-05 @ 07:52)  WBC Count: 11.00 K/uL (06-03 @ 21:45)                            11.1   6.83  )-----------( 292      ( 09 Jun 2025 06:30 )             34.8       06-08    139  |  109[H]  |  14  ----------------------------<  110[H]  3.9   |  23  |  0.77    Ca    8.9      08 Jun 2025 06:00        Urinalysis Basic - ( 08 Jun 2025 06:00 )    Color: x / Appearance: x / SG: x / pH: x  Gluc: 110 mg/dL / Ketone: x  / Bili: x / Urobili: x   Blood: x / Protein: x / Nitrite: x   Leuk Esterase: x / RBC: x / WBC x   Sq Epi: x / Non Sq Epi: x / Bacteria: x        Creatinine Trend: 0.77<--, 0.75<--, 0.75<--, 0.82<--      MICROBIOLOGY:  v  RADIOLOGY:       JOE FISH  [id number]  75y (1950)    Follow Up:  non-healing wound    Interval History: The pt was seen and examined earlier, not in acute distress, no new complaints, awake and alert, appropriate. Pt is afebrile, RA, no leukocytosis.     PAST MEDICAL & SURGICAL HISTORY:  CAD (coronary artery disease)      HTN (hypertension)      Kidney stones      Diabetes      Stented coronary artery      Stented coronary artery          ROS:    [ ] Unobtainable because:  [x ] All other systems negative    Constitutional: no fever, no chills  Head: no trauma  Eyes: no vision changes, no eye pain  ENT:  no sore throat, no rhinorrhea  Cardiovascular:  no chest pain, no palpitation  Respiratory:  no SOB, no cough  GI:  no abd pain, no vomiting, no diarrhea  urinary: no dysuria, no hematuria, no flank pain  musculoskeletal:  no joint pain, no joint swelling  skin:  no rash  neurology:  no headache, no seizure, no change in mental status  psych: no anxiety, no depression         Allergies  No Known Allergies        ANTIMICROBIALS:  ceFAZolin   IVPB 2000 every 8 hours      OTHER MEDS:  acetaminophen     Tablet .. 975 milliGRAM(s) Oral every 8 hours PRN  aluminum hydroxide/magnesium hydroxide/simethicone Suspension 30 milliLiter(s) Oral every 4 hours PRN  atorvastatin 80 milliGRAM(s) Oral at bedtime  bisacodyl 5 milliGRAM(s) Oral daily PRN  clopidogrel Tablet 75 milliGRAM(s) Oral daily  dextrose 5%. 1000 milliLiter(s) IV Continuous <Continuous>  dextrose 50% Injectable 25 Gram(s) IV Push once  dextrose Oral Gel 15 Gram(s) Oral once PRN  enoxaparin Injectable 40 milliGRAM(s) SubCutaneous every 24 hours  ezetimibe 10 milliGRAM(s) Oral daily  glucagon  Injectable 1 milliGRAM(s) IntraMuscular once  insulin glargine Injectable (LANTUS) 10 Unit(s) SubCutaneous at bedtime  insulin lispro (ADMELOG) corrective regimen sliding scale   SubCutaneous three times a day before meals  insulin lispro Injectable (ADMELOG) 5 Unit(s) SubCutaneous three times a day before meals  melatonin 3 milliGRAM(s) Oral at bedtime PRN  metoprolol succinate ER 50 milliGRAM(s) Oral daily  naloxone Injectable 0.4 milliGRAM(s) IV Push once  ondansetron Injectable 4 milliGRAM(s) IV Push every 8 hours PRN  oxyCODONE    IR 2.5 milliGRAM(s) Oral every 4 hours PRN  oxyCODONE    IR 5 milliGRAM(s) Oral every 4 hours PRN  polyethylene glycol 3350 17 Gram(s) Oral daily  senna 2 Tablet(s) Oral at bedtime      Vital Signs Last 24 Hrs  T(C): 36.9 (09 Jun 2025 10:44), Max: 37.1 (09 Jun 2025 05:30)  T(F): 98.4 (09 Jun 2025 10:44), Max: 98.7 (09 Jun 2025 05:30)  HR: 75 (09 Jun 2025 15:15) (68 - 80)  BP: 129/76 (09 Jun 2025 15:15) (107/60 - 131/70)  BP(mean): --  RR: 18 (09 Jun 2025 15:15) (17 - 18)  SpO2: 100% (09 Jun 2025 15:15) (94% - 100%)    Parameters below as of 09 Jun 2025 15:15  Patient On (Oxygen Delivery Method): room air        Physical Exam:  General:    NAD, non toxic  Head: atraumatic, normocephalic  Eyes: normal sclera and conjunctiva  ENT:   no oropharyngeal lesions, no LAD, neck supple  Cardio:    regular S1,S2  Respiratory:   clear to auscultation b/l, no wheezing  abd:   soft, BS +, not tender, no distention  :     no CVAT, no suprapubic tenderness, no vidales  Musculoskeletal : no joint swelling, Left lower extremity above ankle region with large open wound around the shin region, looks to be mostly denuded skin and superficial, no malodor, no edema   Skin:    no rash  Neurologic:     no focal deficits  psych: normal affect    WBC Count: 6.83 K/uL (06-09 @ 06:30)  WBC Count: 7.01 K/uL (06-08 @ 06:00)  WBC Count: 7.63 K/uL (06-07 @ 07:08)  WBC Count: 7.14 K/uL (06-06 @ 08:04)  WBC Count: 8.12 K/uL (06-05 @ 07:52)  WBC Count: 11.00 K/uL (06-03 @ 21:45)                            11.1   6.83  )-----------( 292      ( 09 Jun 2025 06:30 )             34.8       06-08    139  |  109[H]  |  14  ----------------------------<  110[H]  3.9   |  23  |  0.77    Ca    8.9      08 Jun 2025 06:00        Urinalysis Basic - ( 08 Jun 2025 06:00 )    Color: x / Appearance: x / SG: x / pH: x  Gluc: 110 mg/dL / Ketone: x  / Bili: x / Urobili: x   Blood: x / Protein: x / Nitrite: x   Leuk Esterase: x / RBC: x / WBC x   Sq Epi: x / Non Sq Epi: x / Bacteria: x        Creatinine Trend: 0.77<--, 0.75<--, 0.75<--, 0.82<--      MICROBIOLOGY:    RADIOLOGY:

## 2025-06-09 NOTE — PROGRESS NOTE ADULT - ASSESSMENT
A/P-  75-year-old female with past medical history of hypertension, hyperlipidemia, diabetes, CAD with stent placement, presents today with lower extremity cellulitis that failed outpatient oral antibiotic therapy. Patient states that about 1-2 months ago, she had a cut on her left lower extremity that soon became infected and the area became erythematous. Patient had visited an outpatient facility twice for antibiotic therapy but the infection failed to respond to both of these antibiotics. Patient cannot recall name of antibiotics prescribed but as per chart review, patient was prescribed Keflex on 4/2/2025. Patient states she completed her antibiotic course. Patient states that area has been draining watery pus. She also notes area has been erythematous. Patient denies fever, chills, nausea, vomiting, and diarrhea.   ED course: Patient afebrile (although T 99.3) and hemodynamically stable. Labs remarkable for WBC 11.00. Duplex US b/l LE shows no evidence of deep venous thrombosis in either lower extremity. (04 Jun 2025 03:17)    Infectious Disease Consultation requested today to help with abx management.    Afebrile  No leukocytosis  venous  US of LE- reported as no dvt  MRSA PCR-negative    I suspect patient's LLE nonhealing wound is secondary to poor vasculature and most likely PAD    6/9: no fever, RA, no leukocytosis, Cr ok, no culture data available, vascular workup is in progress, Cefazolin IV continued.     Impression-  nonhealing LLE wound most likely sec to PAD  CAD  Diabetes    Plan-  continue IV cefazolin (day #1)  Vancomycin IV and Zosyn IV were stopped yesterday   duration of ABX TBD based on vascular studies   wound care as per wound team.  rest of the medical management as per medicine team    discussed with Dr. Ge  A/P-  75-year-old female with past medical history of hypertension, hyperlipidemia, diabetes, CAD with stent placement, presents today with lower extremity cellulitis that failed outpatient oral antibiotic therapy. Patient states that about 1-2 months ago, she had a cut on her left lower extremity that soon became infected and the area became erythematous. Patient had visited an outpatient facility twice for antibiotic therapy but the infection failed to respond to both of these antibiotics. Patient cannot recall name of antibiotics prescribed but as per chart review, patient was prescribed Keflex on 4/2/2025. Patient states she completed her antibiotic course. Patient states that area has been draining watery pus. She also notes area has been erythematous. Patient denies fever, chills, nausea, vomiting, and diarrhea.   ED course: Patient afebrile (although T 99.3) and hemodynamically stable. Labs remarkable for WBC 11.00. Duplex US b/l LE shows no evidence of deep venous thrombosis in either lower extremity. (04 Jun 2025 03:17)    Infectious Disease Consultation requested  to help with abx management.    Afebrile  No leukocytosis  venous  US of LE- reported as no dvt  MRSA PCR-negative  I suspect patient's LLE nonhealing wound is secondary to poor vasculature and most likely PAD    6/9: no fever, RA, no leukocytosis, Cr ok, no culture data available, vascular workup is in progress, Cefazolin IV continued.     Impression-  nonhealing LLE wound most likely sec to PAD  CAD  Diabetes    Plan-  continue IV cefazolin (day #1)  Vancomycin IV and Zosyn IV were stopped yesterday   duration of ABX TBD based on vascular studies   wound care as per wound team.  rest of the medical management as per medicine team    discussed with Dr. Ge

## 2025-06-09 NOTE — PROGRESS NOTE ADULT - ASSESSMENT
Will forward to Dr. BAILEY pharmacy pended.    75-year-old female with past medical history of hypertension, hyperlipidemia, diabetes, CAD with stent placement, presents with lower extremity cellulitis that failed outpatient oral antibiotic therapy, admitted for IV antibiotic therapy    #Cellulitis lower extremities  Patient presents with left lower extremity cellulitis that has been worsening despite outpatient antibiotic therapy. Patient cannot recall name of antibiotic provided but as per chart review, patient was prescribed a course of keflex on 4/2. Physical exam reveals erythema of left lower extremity with yellowing and crusting of the periphery of the region   - ID input appreciated, change to IV Cefazolin.    - MRSA ordered  - Blood cultures ordered  - Wound care evaluation.     # ? PVD - pt has been referred to Dr. Montero for outpatient vascular evaluation.  Awaiting read on LE arterial dopplers.   # Diabetes Type II - monitor fingersticks.  Insulin coverage for hyperglycemia.   # HTN: Continue metoprolol  # HLD: Continue atorvastatin and ezetimibe  # CAD s/p stent: Continue plavix  # Inpatient DVT Prophylaxis - Lovenox subcut Limited mobility.

## 2025-06-09 NOTE — PROGRESS NOTE ADULT - SUBJECTIVE AND OBJECTIVE BOX
Patient: JOE FISH [id number] 75y Female                            Hospitalist Attending Note    No complaints.   No fever / chills    ____________________PHYSICAL EXAM:  GENERAL:  NAD Alert and Oriented x 3   HEENT: NCAT  CARDIOVASCULAR:  S1, S2  LUNGS: CTAB  ABDOMEN:  soft, (-) tenderness, (-) distension, (+) bowel sounds, (-) guarding, (-) rebound (-) rigidity  EXTREMITIES:  no cyanosis / clubbing / edema.  LLE open wounds with clean base, minimal surrounding erythema.   ____________________      VITALS:  Vital Signs Last 24 Hrs  T(C): 36.9 (09 Jun 2025 10:44), Max: 37.1 (09 Jun 2025 05:30)  T(F): 98.4 (09 Jun 2025 10:44), Max: 98.7 (09 Jun 2025 05:30)  HR: 75 (09 Jun 2025 15:15) (68 - 80)  BP: 129/76 (09 Jun 2025 15:15) (107/60 - 131/70)  BP(mean): --  RR: 18 (09 Jun 2025 15:15) (17 - 18)  SpO2: 100% (09 Jun 2025 15:15) (94% - 100%)    Parameters below as of 09 Jun 2025 15:15  Patient On (Oxygen Delivery Method): room air     Daily     Daily   CAPILLARY BLOOD GLUCOSE      POCT Blood Glucose.: 146 mg/dL (09 Jun 2025 16:34)  POCT Blood Glucose.: 246 mg/dL (09 Jun 2025 11:30)  POCT Blood Glucose.: 123 mg/dL (09 Jun 2025 08:10)  POCT Blood Glucose.: 184 mg/dL (08 Jun 2025 21:26)    I&O's Summary    08 Jun 2025 07:01  -  09 Jun 2025 07:00  --------------------------------------------------------  IN: 524 mL / OUT: 0 mL / NET: 524 mL        LABS:                        11.1   6.83  )-----------( 292      ( 09 Jun 2025 06:30 )             34.8     06-08    139  |  109[H]  |  14  ----------------------------<  110[H]  3.9   |  23  |  0.77    Ca    8.9      08 Jun 2025 06:00          Urinalysis Basic - ( 08 Jun 2025 06:00 )    Color: x / Appearance: x / SG: x / pH: x  Gluc: 110 mg/dL / Ketone: x  / Bili: x / Urobili: x   Blood: x / Protein: x / Nitrite: x   Leuk Esterase: x / RBC: x / WBC x   Sq Epi: x / Non Sq Epi: x / Bacteria: x              MEDICATIONS:  acetaminophen     Tablet .. 975 milliGRAM(s) Oral every 8 hours PRN  aluminum hydroxide/magnesium hydroxide/simethicone Suspension 30 milliLiter(s) Oral every 4 hours PRN  atorvastatin 80 milliGRAM(s) Oral at bedtime  bisacodyl 5 milliGRAM(s) Oral daily PRN  ceFAZolin   IVPB 2000 milliGRAM(s) IV Intermittent every 8 hours  clopidogrel Tablet 75 milliGRAM(s) Oral daily  dextrose 5%. 1000 milliLiter(s) IV Continuous <Continuous>  dextrose 50% Injectable 25 Gram(s) IV Push once  dextrose Oral Gel 15 Gram(s) Oral once PRN  enoxaparin Injectable 40 milliGRAM(s) SubCutaneous every 24 hours  ezetimibe 10 milliGRAM(s) Oral daily  glucagon  Injectable 1 milliGRAM(s) IntraMuscular once  insulin glargine Injectable (LANTUS) 10 Unit(s) SubCutaneous at bedtime  insulin lispro (ADMELOG) corrective regimen sliding scale   SubCutaneous three times a day before meals  insulin lispro Injectable (ADMELOG) 5 Unit(s) SubCutaneous three times a day before meals  melatonin 3 milliGRAM(s) Oral at bedtime PRN  metoprolol succinate ER 50 milliGRAM(s) Oral daily  naloxone Injectable 0.4 milliGRAM(s) IV Push once  ondansetron Injectable 4 milliGRAM(s) IV Push every 8 hours PRN  oxyCODONE    IR 2.5 milliGRAM(s) Oral every 4 hours PRN  oxyCODONE    IR 5 milliGRAM(s) Oral every 4 hours PRN  polyethylene glycol 3350 17 Gram(s) Oral daily  senna 2 Tablet(s) Oral at bedtime

## 2025-06-09 NOTE — PROGRESS NOTE ADULT - NS ATTEND AMEND GEN_ALL_CORE FT
All labs and cultures and imaging and pertinent chart notes reviewed by me.    case d/w Np Lyric at length and agree with her assessment and plan.    6/9:   remains afebrile  no leukocytosis,  no culture data available    Impression-  nonhealing LLE wound most likely sec to PAD  CAD  Diabetes    Plan-  continue IV cefazolin (day #1)   was on vanco and zosyn per medicine team prior to this. ( d/c today)  duration of ABX TBD based on vascular studies -  awaiting LE arterial dopplers and vascular work up.  wound care as per wound team.  rest of the medical management as per medicine team    Teresa Ge MD  Infectious Disease Attending    for any questions please do not hesitate to contact me either via teams or by calling [phone]

## 2025-06-10 LAB
ANION GAP SERPL CALC-SCNC: 6 MMOL/L — SIGNIFICANT CHANGE UP (ref 5–17)
BUN SERPL-MCNC: 19 MG/DL — SIGNIFICANT CHANGE UP (ref 7–23)
CALCIUM SERPL-MCNC: 9.2 MG/DL — SIGNIFICANT CHANGE UP (ref 8.5–10.1)
CHLORIDE SERPL-SCNC: 109 MMOL/L — HIGH (ref 96–108)
CO2 SERPL-SCNC: 25 MMOL/L — SIGNIFICANT CHANGE UP (ref 22–31)
CREAT SERPL-MCNC: 0.73 MG/DL — SIGNIFICANT CHANGE UP (ref 0.5–1.3)
EGFR: 87 ML/MIN/1.73M2 — SIGNIFICANT CHANGE UP
EGFR: 87 ML/MIN/1.73M2 — SIGNIFICANT CHANGE UP
GLUCOSE BLDC GLUCOMTR-MCNC: 112 MG/DL — HIGH (ref 70–99)
GLUCOSE BLDC GLUCOMTR-MCNC: 142 MG/DL — HIGH (ref 70–99)
GLUCOSE BLDC GLUCOMTR-MCNC: 213 MG/DL — HIGH (ref 70–99)
GLUCOSE BLDC GLUCOMTR-MCNC: 99 MG/DL — SIGNIFICANT CHANGE UP (ref 70–99)
GLUCOSE SERPL-MCNC: 93 MG/DL — SIGNIFICANT CHANGE UP (ref 70–99)
HCT VFR BLD CALC: 34.1 % — LOW (ref 34.5–45)
HGB BLD-MCNC: 10.8 G/DL — LOW (ref 11.5–15.5)
MCHC RBC-ENTMCNC: 25.3 PG — LOW (ref 27–34)
MCHC RBC-ENTMCNC: 31.7 G/DL — LOW (ref 32–36)
MCV RBC AUTO: 79.9 FL — LOW (ref 80–100)
NRBC BLD AUTO-RTO: 0 /100 WBCS — SIGNIFICANT CHANGE UP (ref 0–0)
PLATELET # BLD AUTO: 295 K/UL — SIGNIFICANT CHANGE UP (ref 150–400)
POTASSIUM SERPL-MCNC: 3.6 MMOL/L — SIGNIFICANT CHANGE UP (ref 3.5–5.3)
POTASSIUM SERPL-SCNC: 3.6 MMOL/L — SIGNIFICANT CHANGE UP (ref 3.5–5.3)
RBC # BLD: 4.27 M/UL — SIGNIFICANT CHANGE UP (ref 3.8–5.2)
RBC # FLD: 13.4 % — SIGNIFICANT CHANGE UP (ref 10.3–14.5)
SODIUM SERPL-SCNC: 140 MMOL/L — SIGNIFICANT CHANGE UP (ref 135–145)
WBC # BLD: 7.66 K/UL — SIGNIFICANT CHANGE UP (ref 3.8–10.5)
WBC # FLD AUTO: 7.66 K/UL — SIGNIFICANT CHANGE UP (ref 3.8–10.5)

## 2025-06-10 PROCEDURE — 99232 SBSQ HOSP IP/OBS MODERATE 35: CPT

## 2025-06-10 RX ORDER — INSULIN GLARGINE-YFGN 100 [IU]/ML
5 INJECTION, SOLUTION SUBCUTANEOUS AT BEDTIME
Refills: 0 | Status: DISCONTINUED | OUTPATIENT
Start: 2025-06-10 | End: 2025-06-11

## 2025-06-10 RX ADMIN — ATORVASTATIN CALCIUM 80 MILLIGRAM(S): 80 TABLET, FILM COATED ORAL at 21:21

## 2025-06-10 RX ADMIN — CLOPIDOGREL BISULFATE 75 MILLIGRAM(S): 75 TABLET, FILM COATED ORAL at 12:12

## 2025-06-10 RX ADMIN — INSULIN LISPRO 4: 100 INJECTION, SOLUTION INTRAVENOUS; SUBCUTANEOUS at 12:10

## 2025-06-10 RX ADMIN — ENOXAPARIN SODIUM 40 MILLIGRAM(S): 100 INJECTION SUBCUTANEOUS at 19:03

## 2025-06-10 RX ADMIN — Medication 100 MILLIGRAM(S): at 05:54

## 2025-06-10 RX ADMIN — Medication 100 MILLIGRAM(S): at 14:42

## 2025-06-10 RX ADMIN — INSULIN LISPRO 5 UNIT(S): 100 INJECTION, SOLUTION INTRAVENOUS; SUBCUTANEOUS at 12:11

## 2025-06-10 RX ADMIN — INSULIN GLARGINE-YFGN 5 UNIT(S): 100 INJECTION, SOLUTION SUBCUTANEOUS at 22:20

## 2025-06-10 RX ADMIN — METOPROLOL SUCCINATE 50 MILLIGRAM(S): 50 TABLET, EXTENDED RELEASE ORAL at 05:53

## 2025-06-10 RX ADMIN — Medication 100 MILLIGRAM(S): at 21:21

## 2025-06-10 RX ADMIN — EZETIMIBE 10 MILLIGRAM(S): 10 TABLET ORAL at 12:12

## 2025-06-10 NOTE — DIETITIAN INITIAL EVALUATION ADULT - PERTINENT LABORATORY DATA
06-10    140  |  109[H]  |  19  ----------------------------<  93  3.6   |  25  |  0.73    Ca    9.2      10 Rajendra 2025 06:30    POCT Blood Glucose.: 213 mg/dL (06-10-25 @ 11:23)  A1C with Estimated Average Glucose Result: 7.0 % (06-05-25 @ 07:52)

## 2025-06-10 NOTE — DIETITIAN INITIAL EVALUATION ADULT - NS FNS DIET ORDER
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

## 2025-06-10 NOTE — PROGRESS NOTE ADULT - SUBJECTIVE AND OBJECTIVE BOX
Patient: JOE FISH [id number] 75y Female                            Hospitalist Attending Note    No complaints.   No fever / chills    ____________________PHYSICAL EXAM:  GENERAL:  NAD Alert and Oriented x 3   HEENT: NCAT  CARDIOVASCULAR:  S1, S2  LUNGS: CTAB  ABDOMEN:  soft, (-) tenderness, (-) distension, (+) bowel sounds, (-) guarding, (-) rebound (-) rigidity  EXTREMITIES:  no cyanosis / clubbing / edema.  LLE open wounds with clean base, minimal surrounding erythema.   ____________________      VITALS:  Vital Signs Last 24 Hrs  T(C): 36.8 (10 Rajendra 2025 11:05), Max: 37 (09 Jun 2025 19:04)  T(F): 98.2 (10 Rajendra 2025 11:05), Max: 98.6 (09 Jun 2025 19:04)  HR: 68 (10 Rajendra 2025 11:05) (68 - 79)  BP: 100/65 (10 Rajendra 2025 11:05) (100/65 - 143/81)  BP(mean): --  RR: 18 (10 Rajendra 2025 11:05) (17 - 18)  SpO2: 98% (10 Rajendra 2025 11:05) (95% - 98%)    Parameters below as of 10 Rajendra 2025 11:05  Patient On (Oxygen Delivery Method): room air     Daily     Daily   CAPILLARY BLOOD GLUCOSE      POCT Blood Glucose.: 112 mg/dL (10 Rajendra 2025 16:34)  POCT Blood Glucose.: 213 mg/dL (10 Rajendra 2025 11:23)  POCT Blood Glucose.: 99 mg/dL (10 Rajendra 2025 07:58)  POCT Blood Glucose.: 135 mg/dL (09 Jun 2025 21:18)    I&O's Summary      LABS:                        10.8   7.66  )-----------( 295      ( 10 Rajendra 2025 06:30 )             34.1     06-10    140  |  109[H]  |  19  ----------------------------<  93  3.6   |  25  |  0.73    Ca    9.2      10 Rajendra 2025 06:30          Urinalysis Basic - ( 10 Rajendra 2025 06:30 )    Color: x / Appearance: x / SG: x / pH: x  Gluc: 93 mg/dL / Ketone: x  / Bili: x / Urobili: x   Blood: x / Protein: x / Nitrite: x   Leuk Esterase: x / RBC: x / WBC x   Sq Epi: x / Non Sq Epi: x / Bacteria: x              MEDICATIONS:  acetaminophen     Tablet .. 975 milliGRAM(s) Oral every 8 hours PRN  aluminum hydroxide/magnesium hydroxide/simethicone Suspension 30 milliLiter(s) Oral every 4 hours PRN  atorvastatin 80 milliGRAM(s) Oral at bedtime  bisacodyl 5 milliGRAM(s) Oral daily PRN  ceFAZolin   IVPB 2000 milliGRAM(s) IV Intermittent every 8 hours  clopidogrel Tablet 75 milliGRAM(s) Oral daily  dextrose 5%. 1000 milliLiter(s) IV Continuous <Continuous>  dextrose 50% Injectable 25 Gram(s) IV Push once  dextrose Oral Gel 15 Gram(s) Oral once PRN  enoxaparin Injectable 40 milliGRAM(s) SubCutaneous every 24 hours  ezetimibe 10 milliGRAM(s) Oral daily  glucagon  Injectable 1 milliGRAM(s) IntraMuscular once  insulin glargine Injectable (LANTUS) 5 Unit(s) SubCutaneous at bedtime  insulin lispro (ADMELOG) corrective regimen sliding scale   SubCutaneous three times a day before meals  insulin lispro Injectable (ADMELOG) 5 Unit(s) SubCutaneous three times a day before meals  melatonin 3 milliGRAM(s) Oral at bedtime PRN  metoprolol succinate ER 50 milliGRAM(s) Oral daily  naloxone Injectable 0.4 milliGRAM(s) IV Push once  ondansetron Injectable 4 milliGRAM(s) IV Push every 8 hours PRN  oxyCODONE    IR 2.5 milliGRAM(s) Oral every 4 hours PRN  oxyCODONE    IR 5 milliGRAM(s) Oral every 4 hours PRN  polyethylene glycol 3350 17 Gram(s) Oral daily  senna 2 Tablet(s) Oral at bedtime

## 2025-06-10 NOTE — CHART NOTE - NSCHARTNOTEFT_GEN_A_CORE
The patient has a mobility limitation that prevents the patient from accomplishing one or more of his/her ADLs in the home.  The functional mobility deficit can be sufficiently resolved with use of a rolling walker.

## 2025-06-10 NOTE — DIETITIAN INITIAL EVALUATION ADULT - PERTINENT MEDS FT
MEDICATIONS  (STANDING):  atorvastatin 80 milliGRAM(s) Oral at bedtime  ceFAZolin   IVPB 2000 milliGRAM(s) IV Intermittent every 8 hours  clopidogrel Tablet 75 milliGRAM(s) Oral daily  dextrose 5%. 1000 milliLiter(s) (50 mL/Hr) IV Continuous <Continuous>  dextrose 50% Injectable 25 Gram(s) IV Push once  enoxaparin Injectable 40 milliGRAM(s) SubCutaneous every 24 hours  ezetimibe 10 milliGRAM(s) Oral daily  glucagon  Injectable 1 milliGRAM(s) IntraMuscular once  insulin glargine Injectable (LANTUS) 5 Unit(s) SubCutaneous at bedtime  insulin lispro (ADMELOG) corrective regimen sliding scale   SubCutaneous three times a day before meals  insulin lispro Injectable (ADMELOG) 5 Unit(s) SubCutaneous three times a day before meals  metoprolol succinate ER 50 milliGRAM(s) Oral daily  naloxone Injectable 0.4 milliGRAM(s) IV Push once  polyethylene glycol 3350 17 Gram(s) Oral daily  senna 2 Tablet(s) Oral at bedtime    MEDICATIONS  (PRN):  acetaminophen     Tablet .. 975 milliGRAM(s) Oral every 8 hours PRN Mild Pain (1 - 3)  aluminum hydroxide/magnesium hydroxide/simethicone Suspension 30 milliLiter(s) Oral every 4 hours PRN Dyspepsia  bisacodyl 5 milliGRAM(s) Oral daily PRN Constipation  dextrose Oral Gel 15 Gram(s) Oral once PRN Blood Glucose LESS THAN 70 milliGRAM(s)/deciliter  melatonin 3 milliGRAM(s) Oral at bedtime PRN Insomnia  ondansetron Injectable 4 milliGRAM(s) IV Push every 8 hours PRN Nausea and/or Vomiting  oxyCODONE    IR 2.5 milliGRAM(s) Oral every 4 hours PRN Moderate Pain (4 - 6)  oxyCODONE    IR 5 milliGRAM(s) Oral every 4 hours PRN Severe Pain (7 - 10)

## 2025-06-10 NOTE — PROGRESS NOTE ADULT - ASSESSMENT
75-year-old female with past medical history of hypertension, hyperlipidemia, diabetes, CAD with stent placement, presents with lower extremity cellulitis that failed outpatient oral antibiotic therapy, admitted for IV antibiotic therapy    #Cellulitis lower extremities  Patient presents with left lower extremity cellulitis that has been worsening despite outpatient antibiotic therapy. Patient cannot recall name of antibiotic provided but as per chart review, patient was prescribed a course of keflex on 4/2. Physical exam reveals erythema of left lower extremity with yellowing and crusting of the periphery of the region   - Continue Cefazolin per ID.  - Wound care - appears to be improving.    # PVD - LE arterial doppplers d/w Dr. Montero.  Outpatient f/u for wound care, further management.  # Diabetes Type II - monitor fingersticks.  Insulin coverage for hyperglycemia.   # HTN: Continue metoprolol  # HLD: Continue atorvastatin and ezetimibe  # CAD s/p stent: Continue plavix  # Inpatient DVT Prophylaxis - Lovenox subcut Limited mobility.

## 2025-06-10 NOTE — PHARMACOTHERAPY INTERVENTION NOTE - COMMENTS
As per policy vancomycin level ordered.    Please avoid collection during vancomycin infusion.
Recommended to reduce insulin lantus dose due to BG in high 90s. Provider requested 5 units and will monitor and adjust as warranted. 
As per policy vancomycin level ordered.
As per policy, vancomycin level ordered. 
Reviewed in accordance with age-friendly measure; patient was on oxycodone 5mg for moderate pain and 10mg for severe pain. Recommended to de-escalate, provider approved to reduce dosage on both medications. 
As per policy vancomycin dose adjusted.     Pt on vancomycin 1000mg Q24h with calculated AUC of 323. Therapeutic AUC range is 400-600. Vancomycin dose adjusted to 1500mg Q24h which would provide an AUC of 484.
Reviewed in accordance with age-friendly measure; patient was on oxycodone 5mg for moderate pain and 10mg for severe pain. Recommended to de-escalate, provider approved to reduce dosage on both medications. 
Adjusted dose of vancomycin 1250mg q 12h to vancomycin 1000mg q 24h for expected auc 400

## 2025-06-10 NOTE — DIETITIAN INITIAL EVALUATION ADULT - OTHER INFO
Pt reports  lbs, 1.5 years ago lost 60 lbs intentionally but then gained 15 lbs back and tries to follow healthy eating patterns. Pt reports used to consume a lot of pasta, has cut that down, once in a while will have some bread and does consume salads with a protein food source. Pt reports compliant with Metformin medications and doesn't know her last Hgb A1c percentage. 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 Pt declined need for nutrition interventions at this time and hoping to go home Thursday.

## 2025-06-11 ENCOUNTER — TRANSCRIPTION ENCOUNTER (OUTPATIENT)
Age: 75
End: 2025-06-11

## 2025-06-11 VITALS
TEMPERATURE: 98 F | DIASTOLIC BLOOD PRESSURE: 74 MMHG | OXYGEN SATURATION: 96 % | RESPIRATION RATE: 17 BRPM | SYSTOLIC BLOOD PRESSURE: 114 MMHG | HEART RATE: 74 BPM

## 2025-06-11 LAB
GLUCOSE BLDC GLUCOMTR-MCNC: 103 MG/DL — HIGH (ref 70–99)
GLUCOSE BLDC GLUCOMTR-MCNC: 117 MG/DL — HIGH (ref 70–99)
GLUCOSE BLDC GLUCOMTR-MCNC: 155 MG/DL — HIGH (ref 70–99)
HCT VFR BLD CALC: 33.6 % — LOW (ref 34.5–45)
HGB BLD-MCNC: 10.8 G/DL — LOW (ref 11.5–15.5)
MCHC RBC-ENTMCNC: 25.9 PG — LOW (ref 27–34)
MCHC RBC-ENTMCNC: 32.1 G/DL — SIGNIFICANT CHANGE UP (ref 32–36)
MCV RBC AUTO: 80.6 FL — SIGNIFICANT CHANGE UP (ref 80–100)
NRBC BLD AUTO-RTO: 0 /100 WBCS — SIGNIFICANT CHANGE UP (ref 0–0)
PLATELET # BLD AUTO: 294 K/UL — SIGNIFICANT CHANGE UP (ref 150–400)
RBC # BLD: 4.17 M/UL — SIGNIFICANT CHANGE UP (ref 3.8–5.2)
RBC # FLD: 13.5 % — SIGNIFICANT CHANGE UP (ref 10.3–14.5)
WBC # BLD: 6.97 K/UL — SIGNIFICANT CHANGE UP (ref 3.8–10.5)
WBC # FLD AUTO: 6.97 K/UL — SIGNIFICANT CHANGE UP (ref 3.8–10.5)

## 2025-06-11 PROCEDURE — 99232 SBSQ HOSP IP/OBS MODERATE 35: CPT

## 2025-06-11 PROCEDURE — 99223 1ST HOSP IP/OBS HIGH 75: CPT | Mod: FS

## 2025-06-11 PROCEDURE — 99239 HOSP IP/OBS DSCHRG MGMT >30: CPT

## 2025-06-11 PROCEDURE — G0545: CPT

## 2025-06-11 RX ORDER — ATORVASTATIN CALCIUM 80 MG/1
1 TABLET, FILM COATED ORAL
Qty: 0 | Refills: 0 | DISCHARGE
Start: 2025-06-11

## 2025-06-11 RX ORDER — DOXYCYCLINE HYCLATE 100 MG
1 TABLET ORAL
Qty: 14 | Refills: 0
Start: 2025-06-11 | End: 2025-06-17

## 2025-06-11 RX ORDER — EZETIMIBE 10 MG/1
1 TABLET ORAL
Qty: 0 | Refills: 0 | DISCHARGE
Start: 2025-06-11

## 2025-06-11 RX ORDER — CLOPIDOGREL BISULFATE 75 MG/1
1 TABLET, FILM COATED ORAL
Qty: 0 | Refills: 0 | DISCHARGE
Start: 2025-06-11

## 2025-06-11 RX ORDER — METOPROLOL SUCCINATE 50 MG/1
1 TABLET, EXTENDED RELEASE ORAL
Qty: 0 | Refills: 0 | DISCHARGE
Start: 2025-06-11

## 2025-06-11 RX ORDER — LACTOBACILLUS ACIDOPHILUS/PECT 75 MM-100
1 CAPSULE ORAL
Qty: 30 | Refills: 0
Start: 2025-06-11

## 2025-06-11 RX ADMIN — ENOXAPARIN SODIUM 40 MILLIGRAM(S): 100 INJECTION SUBCUTANEOUS at 17:29

## 2025-06-11 RX ADMIN — CLOPIDOGREL BISULFATE 75 MILLIGRAM(S): 75 TABLET, FILM COATED ORAL at 11:47

## 2025-06-11 RX ADMIN — INSULIN LISPRO 5 UNIT(S): 100 INJECTION, SOLUTION INTRAVENOUS; SUBCUTANEOUS at 11:48

## 2025-06-11 RX ADMIN — INSULIN LISPRO 2: 100 INJECTION, SOLUTION INTRAVENOUS; SUBCUTANEOUS at 11:47

## 2025-06-11 RX ADMIN — INSULIN LISPRO 5 UNIT(S): 100 INJECTION, SOLUTION INTRAVENOUS; SUBCUTANEOUS at 17:28

## 2025-06-11 RX ADMIN — METOPROLOL SUCCINATE 50 MILLIGRAM(S): 50 TABLET, EXTENDED RELEASE ORAL at 05:50

## 2025-06-11 RX ADMIN — Medication 100 MILLIGRAM(S): at 05:50

## 2025-06-11 RX ADMIN — EZETIMIBE 10 MILLIGRAM(S): 10 TABLET ORAL at 11:47

## 2025-06-11 RX ADMIN — Medication 100 MILLIGRAM(S): at 13:23

## 2025-06-11 NOTE — PROGRESS NOTE ADULT - SUBJECTIVE AND OBJECTIVE BOX
JOE FISH  [id number]  75y (1950)      Interval History: The pt was seen and examined earlier, not in acute distress, no new complaints. Pt is afebrile, RA, no leukocytosis.     PAST MEDICAL & SURGICAL HISTORY:  CAD (coronary artery disease)      HTN (hypertension)      Kidney stones      Diabetes      Stented coronary artery      Stented coronary artery          ROS:    [ ] Unobtainable because:  [x ] All other systems negative    Constitutional: no fever, no chills  Head: no trauma  Eyes: no vision changes, no eye pain  ENT:  no sore throat, no rhinorrhea  Cardiovascular:  no chest pain, no palpitation  Respiratory:  no SOB, no cough  GI:  no abd pain, no vomiting, no diarrhea  urinary: no dysuria, no hematuria, no flank pain  musculoskeletal:  no joint pain, no joint swelling  skin:  no rash  neurology:  no headache, no seizure, no change in mental status  psych: no anxiety, no depression         Allergies  No Known Allergies        ANTIMICROBIALS:  ceFAZolin   IVPB 2000 every 8 hours      OTHER MEDS:  acetaminophen     Tablet .. 975 milliGRAM(s) Oral every 8 hours PRN  aluminum hydroxide/magnesium hydroxide/simethicone Suspension 30 milliLiter(s) Oral every 4 hours PRN  atorvastatin 80 milliGRAM(s) Oral at bedtime  bisacodyl 5 milliGRAM(s) Oral daily PRN  clopidogrel Tablet 75 milliGRAM(s) Oral daily  dextrose 5%. 1000 milliLiter(s) IV Continuous <Continuous>  dextrose 50% Injectable 25 Gram(s) IV Push once  dextrose Oral Gel 15 Gram(s) Oral once PRN  enoxaparin Injectable 40 milliGRAM(s) SubCutaneous every 24 hours  ezetimibe 10 milliGRAM(s) Oral daily  glucagon  Injectable 1 milliGRAM(s) IntraMuscular once  insulin glargine Injectable (LANTUS) 5 Unit(s) SubCutaneous at bedtime  insulin lispro (ADMELOG) corrective regimen sliding scale   SubCutaneous three times a day before meals  insulin lispro Injectable (ADMELOG) 5 Unit(s) SubCutaneous three times a day before meals  melatonin 3 milliGRAM(s) Oral at bedtime PRN  metoprolol succinate ER 50 milliGRAM(s) Oral daily  naloxone Injectable 0.4 milliGRAM(s) IV Push once  ondansetron Injectable 4 milliGRAM(s) IV Push every 8 hours PRN  oxyCODONE    IR 2.5 milliGRAM(s) Oral every 4 hours PRN  oxyCODONE    IR 5 milliGRAM(s) Oral every 4 hours PRN  polyethylene glycol 3350 17 Gram(s) Oral daily  senna 2 Tablet(s) Oral at bedtime      Vital Signs Last 24 Hrs  T(C): 36.5 (11 Jun 2025 12:04), Max: 36.9 (10 Rajendra 2025 23:56)  T(F): 97.7 (11 Jun 2025 12:04), Max: 98.5 (10 Rajendra 2025 23:56)  HR: 75 (11 Jun 2025 12:04) (68 - 75)  BP: 110/70 (11 Jun 2025 12:04) (110/70 - 151/76)  BP(mean): --  RR: 17 (11 Jun 2025 12:04) (17 - 18)  SpO2: 98% (11 Jun 2025 12:04) (94% - 98%)    Parameters below as of 11 Jun 2025 12:04  Patient On (Oxygen Delivery Method): room air        Physical Exam:  General:    NAD, non toxic  Head: atraumatic, normocephalic  Eyes: normal sclera and conjunctiva  ENT:   no oropharyngeal lesions, no LAD, neck supple  Cardio:    regular S1,S2  Respiratory:   clear to auscultation b/l, no wheezing  abd:   soft, BS +, not tender, no distention  :     no CVAT, no suprapubic tenderness, no vidales  Musculoskeletal : no joint swelling, Left lower extremity above ankle region dry flaky skin, with no erythema, no drainage   Skin:    no rash  Neurologic:     no focal deficits  psych: normal affect    WBC Count: 6.97 K/uL (06-11 @ 07:12)  WBC Count: 7.66 K/uL (06-10 @ 06:30)  WBC Count: 6.83 K/uL (06-09 @ 06:30)  WBC Count: 7.01 K/uL (06-08 @ 06:00)  WBC Count: 7.63 K/uL (06-07 @ 07:08)  WBC Count: 7.14 K/uL (06-06 @ 08:04)  WBC Count: 8.12 K/uL (06-05 @ 07:52)                            10.8   6.97  )-----------( 294      ( 11 Jun 2025 07:12 )             33.6       06-10    140  |  109[H]  |  19  ----------------------------<  93  3.6   |  25  |  0.73    Ca    9.2      10 Rajendra 2025 06:30        Urinalysis Basic - ( 10 Rajendra 2025 06:30 )    Color: x / Appearance: x / SG: x / pH: x  Gluc: 93 mg/dL / Ketone: x  / Bili: x / Urobili: x   Blood: x / Protein: x / Nitrite: x   Leuk Esterase: x / RBC: x / WBC x   Sq Epi: x / Non Sq Epi: x / Bacteria: x        Creatinine Trend: 0.73<--, 0.77<--, 0.75<--, 0.75<--, 0.82<--      MICROBIOLOGY:  v    RADIOLOGY:     JOE FISH  [id number]  75y (1950)      Interval History: The pt was seen and examined earlier, not in acute distress, no new complaints. Pt is afebrile, RA, no leukocytosis.     PAST MEDICAL & SURGICAL HISTORY:  CAD (coronary artery disease)      HTN (hypertension)      Kidney stones      Diabetes      Stented coronary artery      Stented coronary artery          ROS:    [ ] Unobtainable because:  [x ] All other systems negative    Constitutional: no fever, no chills  Head: no trauma  Eyes: no vision changes, no eye pain  ENT:  no sore throat, no rhinorrhea  Cardiovascular:  no chest pain, no palpitation  Respiratory:  no SOB, no cough  GI:  no abd pain, no vomiting, no diarrhea  urinary: no dysuria, no hematuria, no flank pain  musculoskeletal:  no joint pain, no joint swelling  skin:  no rash  neurology:  no headache, no seizure, no change in mental status  psych: no anxiety, no depression         Allergies  No Known Allergies        ANTIMICROBIALS:  ceFAZolin   IVPB 2000 every 8 hours      OTHER MEDS:  acetaminophen     Tablet .. 975 milliGRAM(s) Oral every 8 hours PRN  aluminum hydroxide/magnesium hydroxide/simethicone Suspension 30 milliLiter(s) Oral every 4 hours PRN  atorvastatin 80 milliGRAM(s) Oral at bedtime  bisacodyl 5 milliGRAM(s) Oral daily PRN  clopidogrel Tablet 75 milliGRAM(s) Oral daily  dextrose 5%. 1000 milliLiter(s) IV Continuous <Continuous>  dextrose 50% Injectable 25 Gram(s) IV Push once  dextrose Oral Gel 15 Gram(s) Oral once PRN  enoxaparin Injectable 40 milliGRAM(s) SubCutaneous every 24 hours  ezetimibe 10 milliGRAM(s) Oral daily  glucagon  Injectable 1 milliGRAM(s) IntraMuscular once  insulin glargine Injectable (LANTUS) 5 Unit(s) SubCutaneous at bedtime  insulin lispro (ADMELOG) corrective regimen sliding scale   SubCutaneous three times a day before meals  insulin lispro Injectable (ADMELOG) 5 Unit(s) SubCutaneous three times a day before meals  melatonin 3 milliGRAM(s) Oral at bedtime PRN  metoprolol succinate ER 50 milliGRAM(s) Oral daily  naloxone Injectable 0.4 milliGRAM(s) IV Push once  ondansetron Injectable 4 milliGRAM(s) IV Push every 8 hours PRN  oxyCODONE    IR 2.5 milliGRAM(s) Oral every 4 hours PRN  oxyCODONE    IR 5 milliGRAM(s) Oral every 4 hours PRN  polyethylene glycol 3350 17 Gram(s) Oral daily  senna 2 Tablet(s) Oral at bedtime      Vital Signs Last 24 Hrs  T(C): 36.5 (11 Jun 2025 12:04), Max: 36.9 (10 Rajendra 2025 23:56)  T(F): 97.7 (11 Jun 2025 12:04), Max: 98.5 (10 Rajendra 2025 23:56)  HR: 75 (11 Jun 2025 12:04) (68 - 75)  BP: 110/70 (11 Jun 2025 12:04) (110/70 - 151/76)  BP(mean): --  RR: 17 (11 Jun 2025 12:04) (17 - 18)  SpO2: 98% (11 Jun 2025 12:04) (94% - 98%)    Parameters below as of 11 Jun 2025 12:04  Patient On (Oxygen Delivery Method): room air        Physical Exam:  General:    NAD, non toxic  Head: atraumatic, normocephalic  Eyes: normal sclera and conjunctiva  ENT:   no oropharyngeal lesions, no LAD, neck supple  Cardio:    regular S1,S2  Respiratory:   clear to auscultation b/l, no wheezing  abd:   soft, BS +, not tender, no distention  :     no CVAT, no suprapubic tenderness, no vidales  Musculoskeletal : no joint swelling, Left lower extremity above ankle region dry flaky skin, with no erythema, no drainage   Skin:    no rash  Neurologic:     no focal deficits  psych: normal affect    WBC Count: 6.97 K/uL (06-11 @ 07:12)  WBC Count: 7.66 K/uL (06-10 @ 06:30)  WBC Count: 6.83 K/uL (06-09 @ 06:30)  WBC Count: 7.01 K/uL (06-08 @ 06:00)  WBC Count: 7.63 K/uL (06-07 @ 07:08)  WBC Count: 7.14 K/uL (06-06 @ 08:04)  WBC Count: 8.12 K/uL (06-05 @ 07:52)                            10.8   6.97  )-----------( 294      ( 11 Jun 2025 07:12 )             33.6       06-10    140  |  109[H]  |  19  ----------------------------<  93  3.6   |  25  |  0.73    Ca    9.2      10 Rajendra 2025 06:30        Urinalysis Basic - ( 10 Rajendra 2025 06:30 )    Color: x / Appearance: x / SG: x / pH: x  Gluc: 93 mg/dL / Ketone: x  / Bili: x / Urobili: x   Blood: x / Protein: x / Nitrite: x   Leuk Esterase: x / RBC: x / WBC x   Sq Epi: x / Non Sq Epi: x / Bacteria: x        Creatinine Trend: 0.73<--, 0.77<--, 0.75<--, 0.75<--, 0.82<--      MICROBIOLOGY:      RADIOLOGY:  < from: US Duplex Arterial Lower Ext Compl, Bilateral (06.07.25 @ 10:22) >  IMPRESSION:    No hemodynamically significant right or left femoropopliteal arterial   stenosis.    Segmentally visualized bilateral trifurcation arteries demonstrate   antegrade flow without clear evidence of significant stenosis. Slightly   increased diastolic flow component noted of left-sided trifurcation   arteries image may be seen in the setting of distal ischemia or   hyperemia. Correlate clinically and with cross-sectional imaging as   warranted.    --- End of Report ---            KELLY MENA MD  This document has been electronically signed. Jun 9 2025  6:09PM    < end of copied text >    < from: US Duplex Venous Lower Ext Complete, Bilateral (06.03.25 @ 23:56) >  IMPRESSION:  No evidence of deep venous thrombosis in either lower extremity.            --- End of Report ---            LAM CAMERON MD  This document has been electronically signed. Jun 4 2025 12:56AM    < end of copied text >

## 2025-06-11 NOTE — PROGRESS NOTE ADULT - REASON FOR ADMISSION
Cellulitis that failed outpatient antibiotic therapy

## 2025-06-11 NOTE — DISCHARGE NOTE PROVIDER - NSDCMRMEDTOKEN_GEN_ALL_CORE_FT
No
atorvastatin 80 mg oral tablet: 1 tab(s) orally once a day (at bedtime)  clopidogrel 75 mg oral tablet: 1 tab(s) orally once a day  doxycycline hyclate 100 mg oral capsule: 1 cap(s) orally 2 times a day  ezetimibe 10 mg oral tablet: 1 tab(s) orally once a day  lactobacillus acidophilus oral capsule: 1 cap(s) orally once a day  metFORMIN 500 mg oral tablet, extended release: 1 tab(s) orally once a day  metoprolol succinate 50 mg oral tablet, extended release: 1 tab(s) orally once a day

## 2025-06-11 NOTE — CONSULT NOTE ADULT - ASSESSMENT
75F w/ PMHx HTN, HLD, CAD s/p stents, DM admitted w/ cellulitis and wound now resolved.  Skin appears well, no role for wound care    Plan as discussed w/ Dr. Montero  - Recommend pink foam over LLE and gauze, ABD and kerlex around LLE area  - Can follow up with Dr. Montero in his office upon discharge for management of chronic venous insufficiency  - Likely no role for antibiotics upon discharge  - Continue compression, elevation, cover areas of LE as above  - Continue care per primary team  - Vascular surgery will sign off, please reconsult PRN
A/P-  75-year-old female with past medical history of hypertension, hyperlipidemia, diabetes, CAD with stent placement, presents today with lower extremity cellulitis that failed outpatient oral antibiotic therapy. Patient states that about 1-2 months ago, she had a cut on her left lower extremity that soon became infected and the area became erythematous. Patient had visited an outpatient facility twice for antibiotic therapy but the infection failed to respond to both of these antibiotics. Patient cannot recall name of antibiotics prescribed but as per chart review, patient was prescribed Keflex on 4/2/2025. Patient states she completed her antibiotic course. Patient states that area has been draining watery pus. She also notes area has been erythematous. Patient denies fever, chills, nausea, vomiting, and diarrhea.   ED course: Patient afebrile (although T 99.3) and hemodynamically stable. Labs remarkable for WBC 11.00. Duplex US b/l LE shows no evidence of deep venous thrombosis in either lower extremity. (04 Jun 2025 03:17)    Infectious Disease Consultation requested today to help with abx management.    Afebrile  No leukocytosis  venous  US of LE- reported as no dvt  MRSA PCR-negative    I suspect patient's LLE nonhealing wound is secondary to poor vasculature and most likely PAD    Impression-  nonhealing LLE wound most likely sec to PAD  CAD  Diabetes    Plan-  advise to get arterial dopplers of LE and vascular  consultation.  advise to d/c IV zosyn and vanco and instead start IV cefazolin.  duration of ABX TBD based on vascular studies   wound care as per wound team.  rest of the medical management as per medicine team.    All labs and imaging and chart notes reviewed.     All above discussed with patient and patient verbalizes full understanding of all above and agrees with above plan of care.    Thank you for this consultation.    d/w .    Teresa Ge MD  Infectious Disease Attending    for any questions please do not hesitate to contact me either via teams or by calling [phone]

## 2025-06-11 NOTE — DISCHARGE NOTE PROVIDER - ATTENDING DISCHARGE PHYSICAL EXAMINATION:
Vital Signs Last 24 Hrs  T(C): 36.5 (11 Jun 2025 12:04), Max: 36.9 (10 Rajendra 2025 23:56)  T(F): 97.7 (11 Jun 2025 12:04), Max: 98.5 (10 Rajendra 2025 23:56)  HR: 75 (11 Jun 2025 12:04) (68 - 75)  BP: 110/70 (11 Jun 2025 12:04) (110/70 - 151/76)  BP(mean): --  RR: 17 (11 Jun 2025 12:04) (17 - 18)  SpO2: 98% (11 Jun 2025 12:04) (94% - 98%)    Parameters below as of 11 Jun 2025 12:04  Patient On (Oxygen Delivery Method): room air    GENERAL: NAD, well-groomed, well-developed  HEAD:  Atraumatic, Normocephalic  EYES: EOMI, sclera non-icteric  ENMT:  Moist mucous membranes  NERVOUS SYSTEM:  Alert & Oriented X3, Good concentration  CHEST/LUNG: Clear to percussion bilaterally; No rales, rhonchi, wheezing, or rubs  HEART: Regular rate and rhythm; No murmurs, rubs, or gallops  ABDOMEN: Soft, Nontender, Nondistended; Bowel sounds present  EXTREMITIES:  +LLE wrapped   SKIN: +LLE wound wrapped, bandage c/d/i

## 2025-06-11 NOTE — DISCHARGE NOTE NURSING/CASE MANAGEMENT/SOCIAL WORK - NSDCPEFALRISK_GEN_ALL_CORE
For information on Fall & Injury Prevention, visit: [email or web address] OR  [email or web address] OR  https://www.cdc.gov/steadi/patient.html

## 2025-06-11 NOTE — PROGRESS NOTE ADULT - SUBJECTIVE AND OBJECTIVE BOX
HPI:  75-year-old female with past medical history of hypertension, hyperlipidemia, diabetes, CAD with stent placement, presents today with lower extremity cellulitis that failed outpatient oral antibiotic therapy. Patient states that about 1-2 months ago, she had a cut on her left lower extremity that soon became infected and the area became erythematous. Patient had visited an outpatient facility twice for antibiotic therapy but the infection failed to respond to both of these antibiotics. Patient cannot recall name of antibiotics prescribed but as per chart review, patient was prescribed Keflex on 4/2/2025. Patient states she completed her antibiotic course. Patient states that area has been draining watery pus. She also notes area has been erythematous. Patient denies fever, chills, nausea, vomiting, and diarrhea.     Vascular surgery consulted to evaluated b/l LE wounds. Patient reports R has been there a long time and worsened. States Left was a blister that popped. Currently without symptoms, pain around wound has improved. Clinically improving since admission.    PAST MEDICAL & SURGICAL HISTORY:  CAD (coronary artery disease)  HTN (hypertension)  Kidney stones  Diabetes  Stented coronary artery  Stented coronary artery    Allergies  No Known Allergies    Vital Signs Last 24 Hrs  T(C): 36.5 (11 Jun 2025 12:04), Max: 36.9 (10 Rajendra 2025 23:56)  T(F): 97.7 (11 Jun 2025 12:04), Max: 98.5 (10 Rajendra 2025 23:56)  HR: 75 (11 Jun 2025 12:04) (68 - 75)  BP: 110/70 (11 Jun 2025 12:04) (110/70 - 151/76)  BP(mean): --  RR: 17 (11 Jun 2025 12:04) (17 - 18)  SpO2: 98% (11 Jun 2025 12:04) (94% - 98%)    Parameters below as of 11 Jun 2025 12:04  Patient On (Oxygen Delivery Method): room air      Gen: NAD  HEENT: NCAT, EOMI  CV: RRR  Lung: respirations nonlabored  Extremities: RLE wound w/ lots of dried dead skin and hair, removed and appears without active wound, LLE w/ small medial ulcer from blister                                                                           LABS:                        10.8   6.97  )-----------( 294      ( 11 Jun 2025 07:12 )             33.6     06-10    140  |  109[H]  |  19  ----------------------------<  93  3.6   |  25  |  0.73    Ca    9.2      10 Rajendra 2025 06:30        Urinalysis Basic - ( 10 Rajendra 2025 06:30 )    Color: x / Appearance: x / SG: x / pH: x  Gluc: 93 mg/dL / Ketone: x  / Bili: x / Urobili: x   Blood: x / Protein: x / Nitrite: x   Leuk Esterase: x / RBC: x / WBC x   Sq Epi: x / Non Sq Epi: x / Bacteria: x

## 2025-06-11 NOTE — PROGRESS NOTE ADULT - NS ATTEND AMEND GEN_ALL_CORE FT
All labs and cultures and imaging and pertinent chart notes reviewed by me.    case d/w Np Lyric at length and agree with her assessment and plan.    6/11:   afebrile  no leukocytosis,   no culture data available,   venoud dppler- negative for DVT as per report  Artreial doppler- no significant stenosis as per report    Impression-  nonhealing LLE wound   CAD  Diabetes    Plan-  stop IV cefazolin (day #3  ok to discharge from ID stand point on oral Doxycycline x 7 days  avoid sun exposure while on doxy  probiotics while on po abx  appreciate vascular consult note- outpatient f/u with vascular advised for chronic venous insufficiency management.  rest of the medical management as per medicine team    Teresa Ge MD  Infectious Disease Attending    for any questions please do not hesitate to contact me either via teams or by calling [phone]

## 2025-06-11 NOTE — DISCHARGE NOTE PROVIDER - HOSPITAL COURSE
HPI:  75-year-old female with past medical history of hypertension, hyperlipidemia, diabetes, CAD with stent placement, presents today with lower extremity cellulitis that failed outpatient oral antibiotic therapy. Patient states that about 1-2 months ago, she had a cut on her left lower extremity that soon became infected and the area became erythematous. Patient had visited an outpatient facility twice for antibiotic therapy but the infection failed to respond to both of these antibiotics. Patient cannot recall name of antibiotics prescribed but as per chart review, patient was prescribed Keflex on 4/2/2025. Patient states she completed her antibiotic course. Patient states that area has been draining watery pus. She also notes area has been erythematous. Patient denies fever, chills, nausea, vomiting, and diarrhea.     ED course: Patient afebrile (although T 99.3) and hemodynamically stable. Labs remarkable for WBC 11.00. Duplex US b/l LE shows no evidence of deep venous thrombosis in either lower extremity.     Hospital course:   She was initially treated with intravenous vancomycin and piperacillin-tazobactam, but per infectious disease consultation recommendations, tx was transitioned to cefazolin with plans for discharge on a 7-day course of oral doxycycline along with probiotic supplementation and sun exposure precautions. Vascular surgery was consulted given concern for underlying peripheral vascular disease, with lower extremity arterial Dopplers performed and outpatient follow-up arranged with Dr. Montero for continued wound care management.    #Cellulitis lower extremities  Patient presents with left lower extremity cellulitis that has been worsening despite outpatient antibiotic therapy. Patient cannot recall name of antibiotic provided but as per chart review, patient was prescribed a course of keflex on 4/2. Physical exam reveals erythema of left lower extremity with yellowing and crusting of the periphery of the region   - initially started on zosyn/vanc but transitioned to Ancef per ID recommendations. Per ID, okay to discharge with PO doxy x 7 days. Also recommending probiotic and avoiding sun exposure while on doxycycline.   - Wound care - appears to be improving. Home wound care arranged prior to discharge. Vascular saw this admission, will f/u with Dr Montero in clinic.   # PVD - LE arterial doppplers d/w Dr. Montero.  Outpatient f/u for wound care, further management.  # Diabetes Type II - monitor fingersticks.  Insulin coverage for hyperglycemia.   # HTN: Continue metoprolol  # HLD: Continue atorvastatin and ezetimibe  # CAD s/p stent: Continue plavix    wound care instructions:  L anterior and posterior lower leg c cellulitis : 1) to clean with saline and pat dry 2) Apply liquid barrier film (Cavilon) to periwound 3)apply non-adherent, xeroform dressing4) cover with abdominal pads 5) wrap with kerlix 6) wrap with ace bandage with gentle and even pressure. Change the dressing  daily or when it's compromised.   R lower leg c closed blister from fluid overload: 1) to clean with saline and gentaly pat dry 2) apply non-adherent ( Adaptic) dressing 3) cover with foam dressing, Change the dressing every other days or when it's compromised.

## 2025-06-11 NOTE — CONSULT NOTE ADULT - REASON FOR ADMISSION
Cellulitis that failed outpatient antibiotic therapy
Cellulitis that failed outpatient antibiotic therapy
declines

## 2025-06-11 NOTE — DISCHARGE NOTE PROVIDER - NSDCFUADDAPPT_GEN_ALL_CORE_FT
APPTS ARE READY TO BE MADE: [X] YES    Best Family or Patient Contact (if needed):    Additional Information about above appointments (if needed):    1: PCP 1 week   2: wound care clinic   3:     Other comments or requests:

## 2025-06-11 NOTE — DISCHARGE NOTE NURSING/CASE MANAGEMENT/SOCIAL WORK - PATIENT PORTAL LINK FT
You can access the FollowMyHealth Patient Portal offered by [facility] by registering at the following website: [email or web address]. By joining [employer]’s FollowMyHealth portal, you will also be able to view your health information using other applications (apps) compatible with our system.

## 2025-06-11 NOTE — DISCHARGE NOTE PROVIDER - NSDCCPCAREPLAN_GEN_ALL_CORE_FT
PRINCIPAL DISCHARGE DIAGNOSIS  Diagnosis: Cellulitis  Assessment and Plan of Treatment: You were treated for cellulitis (infection of the skin). You were started on IV antibiotics with improvement in your infection. The infectious disease doctor saw you and recommended taking doxycyline for 1 week after discharge to complete your course of antibiotics. Please take your medications as prescribed. Please take lactobabillus, a probiotic, to protect your gut health while on doxycyline. Please follow up in wound care clinic for further management.

## 2025-06-11 NOTE — DISCHARGE NOTE NURSING/CASE MANAGEMENT/SOCIAL WORK - FINANCIAL ASSISTANCE
[facility] provides services at a reduced cost to those who are determined to be eligible through [facility]’s financial assistance program. Information regarding [facility]’s financial assistance program can be found by going to [email or web address] or by calling [phone].

## 2025-06-11 NOTE — CONSULT NOTE ADULT - NS ATTEND AMEND GEN_ALL_CORE FT
The patient is seen and examined and agree with above. The left leg ulcer is healed, cellulitis is resolved and there is no need for wound care and she can follow up with me in my office for Chronic venous insufficiency and chronic leg edema. the patient has palpable pedal pulses. Arterial duplex is reviewed. Will follow as out patient , no need for any inpatient vascular work up at this time.

## 2025-06-11 NOTE — DISCHARGE NOTE PROVIDER - PROVIDER TOKENS
FREE:[LAST:[Your PCP],PHONE:[(   )    -],FAX:[(   )    -],FOLLOWUP:[1 week]],PROVIDER:[TOKEN:[[id number]],FOLLOWUP:[1 week]]

## 2025-06-11 NOTE — DISCHARGE NOTE PROVIDER - CARE PROVIDERS DIRECT ADDRESSES
,DirectAddress_Unknown,[email or web address]

## 2025-06-11 NOTE — PROGRESS NOTE ADULT - PROVIDER SPECIALTY LIST ADULT
Hospitalist
Infectious Disease
Vascular Surgery
Hospitalist
Infectious Disease
Hospitalist
Hospitalist

## 2025-06-11 NOTE — PROGRESS NOTE ADULT - ASSESSMENT
75F w/ PMHx HTN, HLD, CAD s/p stents, DM admitted w/ cellulitis and wound now resolved.  Skin appears well, no role for wound care    Plan as discussed w/ Dr. Montero  - Recommend pink foam over LLE and gauze, ABD and kerlex around LLE area  - Can follow up with Dr. Montero in his office upon discharge for management of chronic venous insufficiency  - Likely no role for antibiotics upon discharge  - Continue compression, elevation, cover areas of LE as above  - Continue care per primary team  - Vascular surgery will sign off, please reconsult PRN

## 2025-06-11 NOTE — PROGRESS NOTE ADULT - ASSESSMENT
A/P-  75-year-old female with past medical history of hypertension, hyperlipidemia, diabetes, CAD with stent placement, presents today with lower extremity cellulitis that failed outpatient oral antibiotic therapy. Patient states that about 1-2 months ago, she had a cut on her left lower extremity that soon became infected and the area became erythematous. Patient had visited an outpatient facility twice for antibiotic therapy but the infection failed to respond to both of these antibiotics. Patient cannot recall name of antibiotics prescribed but as per chart review, patient was prescribed Keflex on 4/2/2025. Patient states she completed her antibiotic course. Patient states that area has been draining watery pus. She also notes area has been erythematous. Patient denies fever, chills, nausea, vomiting, and diarrhea.   ED course: Patient afebrile (although T 99.3) and hemodynamically stable. Labs remarkable for WBC 11.00. Duplex US b/l LE shows no evidence of deep venous thrombosis in either lower extremity. (04 Jun 2025 03:17)    Infectious Disease Consultation requested  to help with abx management.    Afebrile  No leukocytosis  venous  US of LE- reported as no dvt  MRSA PCR-negative  I suspect patient's LLE nonhealing wound is secondary to poor vasculature and most likely PAD    6/9: no fever, RA, no leukocytosis, Cr ok, no culture data available, vascular workup is in progress, Cefazolin IV continued.   6/11: afebrile, RA, no leukocytosis, no culture data available, Cefazolin day #3, and if medically stable, ok to discharge on po Doxycycline x 7 days, avoid sun, probiotics.     Impression-  nonhealing LLE wound most likely sec to PAD  CAD  Diabetes    Plan-  stop IV cefazolin (day #3)  Vancomycin IV and Zosyn IV were stopped 6/8  ok to discharge from ID stand point on oral Doxycycline x 7 days  avoid sun  probiotics   rest of the medical management as per medicine team    discussed with Dr. Ge   discussed with Dr. Montero  discussed with Dr. Chaudhary  discussed with sudheer GIMENEZ

## 2025-06-11 NOTE — DISCHARGE NOTE PROVIDER - NSDCFUADDINST_GEN_ALL_CORE_FT
Please see your primary care provider within the next 1-2 weeks for a post hospital follow up appointment   Please avoid sun exposure while taking doxycyline as this can increase your risk of sunburn

## 2025-06-17 DIAGNOSIS — E11.9 TYPE 2 DIABETES MELLITUS WITHOUT COMPLICATIONS: ICD-10-CM

## 2025-06-17 DIAGNOSIS — L03.90 CELLULITIS, UNSPECIFIED: ICD-10-CM

## 2025-07-16 NOTE — PRE-OP CHECKLIST - TYPE OF SOLUTION
Attending Physician:  Dr. Jeffery Bennett                          Procedure: colonoscopy    Indication for Procedure: CRC surveillance   ________________________________________________________  PAST MEDICAL & SURGICAL HISTORY:  Benign Essential Hypertension      HLD (Hyperlipidemia)      Diabetes mellitus      Sarcoidosis      Asthma      History of       H/O colonoscopy        ALLERGIES:  penicillins (Unknown)  Chlorhexidine Gluconate (Short breath)  Rocephin (Pruritus)    HOME MEDICATIONS:  aspirin 81 mg oral tablet: 1 tab(s) orally once a day  atorvastatin 40 mg oral tablet: 1 tab(s) orally once a day (at bedtime)  Basaglar KwikPen 100 units/mL subcutaneous solution: 12 unit(s) subcutaneous once a day (at bedtime)  Jardiance 25 mg oral tablet: 1 tab(s) orally once a day  lisinopril 5 mg oral tablet: 1 tab(s) orally once a day  metFORMIN 1000 mg oral tablet: 1 tab(s) orally 2 times a day  metoprolol succinate 25 mg oral tablet, extended release: 1 tab(s) orally once a day  multivitamin: 1 tab(s) orally once a day  NovoLOG FlexPen 100 units/mL subcutaneous solution: 10 unit(s) subcutaneous 3 times a day (with meals)  Trulicity Pen 1.5 mg/0.5 mL subcutaneous solution: 1.5 milligram(s) subcutaneously once a week Thursday  Ventolin HFA CFC free 90 mcg/inh inhalation aerosol: 2 puff(s) inhaled 4 times a day, As Needed    AICD/PPM: [ ] yes   [ ] no    PERTINENT LAB DATA:                      PHYSICAL EXAMINATION:    T(C): --  HR: --  BP: --  RR: --  SpO2: --    Constitutional: NAD    Neck:  No JVD  Respiratory: CTAB/L  Cardiovascular: S1 and S2  Gastrointestinal: BS+, soft, NT/ND  Extremities: No peripheral edema  Neurological: A/O x 3, no focal deficits        COMMENTS:    The patient is a suitable candidate for the planned procedure unless box checked [ ]  No, explain:     .45 ns at 75 ml/hr